# Patient Record
Sex: FEMALE | Race: WHITE | NOT HISPANIC OR LATINO
[De-identification: names, ages, dates, MRNs, and addresses within clinical notes are randomized per-mention and may not be internally consistent; named-entity substitution may affect disease eponyms.]

---

## 2018-02-07 ENCOUNTER — APPOINTMENT (OUTPATIENT)
Dept: NEUROSURGERY | Facility: CLINIC | Age: 83
End: 2018-02-07
Payer: MEDICARE

## 2018-02-07 VITALS
SYSTOLIC BLOOD PRESSURE: 127 MMHG | HEART RATE: 71 BPM | OXYGEN SATURATION: 96 % | BODY MASS INDEX: 32.95 KG/M2 | HEIGHT: 64 IN | TEMPERATURE: 98.5 F | WEIGHT: 193 LBS | DIASTOLIC BLOOD PRESSURE: 57 MMHG

## 2018-02-07 DIAGNOSIS — Z87.09 PERSONAL HISTORY OF OTHER DISEASES OF THE RESPIRATORY SYSTEM: ICD-10-CM

## 2018-02-07 DIAGNOSIS — Z63.4 DISAPPEARANCE AND DEATH OF FAMILY MEMBER: ICD-10-CM

## 2018-02-07 DIAGNOSIS — S49.91XA UNSPECIFIED INJURY OF RIGHT SHOULDER AND UPPER ARM, INITIAL ENCOUNTER: ICD-10-CM

## 2018-02-07 DIAGNOSIS — Z86.69 PERSONAL HISTORY OF OTHER DISEASES OF THE NERVOUS SYSTEM AND SENSE ORGANS: ICD-10-CM

## 2018-02-07 DIAGNOSIS — Z86.39 PERSONAL HISTORY OF OTHER ENDOCRINE, NUTRITIONAL AND METABOLIC DISEASE: ICD-10-CM

## 2018-02-07 DIAGNOSIS — Z86.73 PERSONAL HISTORY OF TRANSIENT ISCHEMIC ATTACK (TIA), AND CEREBRAL INFARCTION W/OUT RESIDUAL DEFICITS: ICD-10-CM

## 2018-02-07 DIAGNOSIS — M19.90 UNSPECIFIED OSTEOARTHRITIS, UNSPECIFIED SITE: ICD-10-CM

## 2018-02-07 PROCEDURE — 99205 OFFICE O/P NEW HI 60 MIN: CPT

## 2018-02-07 RX ORDER — CHROMIUM 200 MCG
TABLET ORAL
Refills: 0 | Status: ACTIVE | COMMUNITY

## 2018-02-07 RX ORDER — SITAGLIPTIN 100 MG/1
TABLET, FILM COATED ORAL
Refills: 0 | Status: ACTIVE | COMMUNITY

## 2018-02-07 RX ORDER — ASPIRIN AND DIPYRIDAMOLE 25; 200 MG/1; MG/1
25-200 CAPSULE, EXTENDED RELEASE ORAL
Refills: 0 | Status: ACTIVE | COMMUNITY

## 2018-02-07 RX ORDER — MEMANTINE HYDROCHLORIDE 5 MG/1
TABLET ORAL
Refills: 0 | Status: ACTIVE | COMMUNITY

## 2018-02-07 SDOH — SOCIAL STABILITY - SOCIAL INSECURITY: DISSAPEARANCE AND DEATH OF FAMILY MEMBER: Z63.4

## 2018-08-01 ENCOUNTER — APPOINTMENT (OUTPATIENT)
Dept: HEART AND VASCULAR | Facility: CLINIC | Age: 83
End: 2018-08-01
Payer: MEDICARE

## 2018-08-01 VITALS
TEMPERATURE: 98.1 F | RESPIRATION RATE: 16 BRPM | HEART RATE: 57 BPM | HEIGHT: 64 IN | WEIGHT: 194 LBS | SYSTOLIC BLOOD PRESSURE: 122 MMHG | BODY MASS INDEX: 33.12 KG/M2 | DIASTOLIC BLOOD PRESSURE: 74 MMHG

## 2018-08-01 DIAGNOSIS — I10 ESSENTIAL (PRIMARY) HYPERTENSION: ICD-10-CM

## 2018-08-01 DIAGNOSIS — Z82.49 FAMILY HISTORY OF ISCHEMIC HEART DISEASE AND OTHER DISEASES OF THE CIRCULATORY SYSTEM: ICD-10-CM

## 2018-08-01 PROCEDURE — 99204 OFFICE O/P NEW MOD 45 MIN: CPT | Mod: 25

## 2018-08-01 PROCEDURE — 93306 TTE W/DOPPLER COMPLETE: CPT

## 2018-08-01 PROCEDURE — 93000 ELECTROCARDIOGRAM COMPLETE: CPT

## 2018-08-01 RX ORDER — DILTIAZEM HYDROCHLORIDE 120 MG/1
120 CAPSULE, EXTENDED RELEASE ORAL
Refills: 0 | Status: COMPLETED | COMMUNITY
End: 2018-08-01

## 2018-08-02 LAB
25(OH)D3 SERPL-MCNC: 33.2 NG/ML
ALBUMIN SERPL ELPH-MCNC: 4.2 G/DL
ALP BLD-CCNC: 62 U/L
ALT SERPL-CCNC: 12 U/L
ANION GAP SERPL CALC-SCNC: 15 MMOL/L
AST SERPL-CCNC: 16 U/L
BASOPHILS # BLD AUTO: 0.03 K/UL
BASOPHILS NFR BLD AUTO: 0.4 %
BILIRUB SERPL-MCNC: 0.2 MG/DL
BUN SERPL-MCNC: 17 MG/DL
CALCIUM SERPL-MCNC: 9.6 MG/DL
CHLORIDE SERPL-SCNC: 104 MMOL/L
CHOLEST SERPL-MCNC: 133 MG/DL
CHOLEST/HDLC SERPL: 3 RATIO
CO2 SERPL-SCNC: 23 MMOL/L
CREAT SERPL-MCNC: 1.12 MG/DL
EOSINOPHIL # BLD AUTO: 0.29 K/UL
EOSINOPHIL NFR BLD AUTO: 3.8 %
GLUCOSE SERPL-MCNC: 91 MG/DL
HBA1C MFR BLD HPLC: 6.7 %
HCT VFR BLD CALC: 31.6 %
HDLC SERPL-MCNC: 45 MG/DL
HGB BLD-MCNC: 9.6 G/DL
IMM GRANULOCYTES NFR BLD AUTO: 0.1 %
LDLC SERPL CALC-MCNC: 55 MG/DL
LYMPHOCYTES # BLD AUTO: 2.11 K/UL
LYMPHOCYTES NFR BLD AUTO: 27.9 %
MAN DIFF?: NORMAL
MCHC RBC-ENTMCNC: 26.4 PG
MCHC RBC-ENTMCNC: 30.4 GM/DL
MCV RBC AUTO: 86.8 FL
MONOCYTES # BLD AUTO: 1.09 K/UL
MONOCYTES NFR BLD AUTO: 14.4 %
NEUTROPHILS # BLD AUTO: 4.03 K/UL
NEUTROPHILS NFR BLD AUTO: 53.4 %
PLATELET # BLD AUTO: 282 K/UL
POTASSIUM SERPL-SCNC: 4.5 MMOL/L
PROT SERPL-MCNC: 6.5 G/DL
RBC # BLD: 3.64 M/UL
RBC # FLD: 16.4 %
SODIUM SERPL-SCNC: 142 MMOL/L
TRIGL SERPL-MCNC: 163 MG/DL
TSH SERPL-ACNC: 2.34 UIU/ML
WBC # FLD AUTO: 7.56 K/UL

## 2018-09-12 ENCOUNTER — APPOINTMENT (OUTPATIENT)
Dept: HEART AND VASCULAR | Facility: CLINIC | Age: 83
End: 2018-09-12
Payer: MEDICARE

## 2018-09-12 VITALS
DIASTOLIC BLOOD PRESSURE: 64 MMHG | HEART RATE: 57 BPM | BODY MASS INDEX: 33.8 KG/M2 | OXYGEN SATURATION: 97 % | SYSTOLIC BLOOD PRESSURE: 112 MMHG | HEIGHT: 64 IN | WEIGHT: 198 LBS

## 2018-09-12 PROCEDURE — 99214 OFFICE O/P EST MOD 30 MIN: CPT

## 2018-10-30 ENCOUNTER — RX RENEWAL (OUTPATIENT)
Age: 83
End: 2018-10-30

## 2020-02-25 ENCOUNTER — INPATIENT (INPATIENT)
Facility: HOSPITAL | Age: 85
LOS: 4 days | Discharge: HOME HEALTH | DRG: 419 | End: 2020-03-01
Attending: SURGERY | Admitting: SURGERY
Payer: MEDICARE

## 2020-02-25 VITALS
RESPIRATION RATE: 18 BRPM | TEMPERATURE: 98 F | WEIGHT: 181 LBS | DIASTOLIC BLOOD PRESSURE: 65 MMHG | HEIGHT: 64 IN | SYSTOLIC BLOOD PRESSURE: 115 MMHG | HEART RATE: 57 BPM | OXYGEN SATURATION: 95 %

## 2020-02-25 LAB
APPEARANCE UR: ABNORMAL
BILIRUB UR-MCNC: NEGATIVE — SIGNIFICANT CHANGE UP
BLD GP AB SCN SERPL QL: NEGATIVE — SIGNIFICANT CHANGE UP
COLOR SPEC: YELLOW — SIGNIFICANT CHANGE UP
DIFF PNL FLD: NEGATIVE — SIGNIFICANT CHANGE UP
GLUCOSE BLDC GLUCOMTR-MCNC: 87 MG/DL — SIGNIFICANT CHANGE UP (ref 70–99)
GLUCOSE BLDC GLUCOMTR-MCNC: 89 MG/DL — SIGNIFICANT CHANGE UP (ref 70–99)
GLUCOSE UR QL: NEGATIVE — SIGNIFICANT CHANGE UP
KETONES UR-MCNC: NEGATIVE — SIGNIFICANT CHANGE UP
LEUKOCYTE ESTERASE UR-ACNC: ABNORMAL
NITRITE UR-MCNC: NEGATIVE — SIGNIFICANT CHANGE UP
PH UR: 7 — SIGNIFICANT CHANGE UP (ref 5–8)
PROT UR-MCNC: NEGATIVE MG/DL — SIGNIFICANT CHANGE UP
RH IG SCN BLD-IMP: POSITIVE — SIGNIFICANT CHANGE UP
SP GR SPEC: 1.01 — SIGNIFICANT CHANGE UP (ref 1–1.03)
UROBILINOGEN FLD QL: 0.2 E.U./DL — SIGNIFICANT CHANGE UP

## 2020-02-25 PROCEDURE — 71045 X-RAY EXAM CHEST 1 VIEW: CPT | Mod: 26

## 2020-02-25 PROCEDURE — 93010 ELECTROCARDIOGRAM REPORT: CPT

## 2020-02-25 PROCEDURE — 74177 CT ABD & PELVIS W/CONTRAST: CPT | Mod: 26

## 2020-02-25 PROCEDURE — 76705 ECHO EXAM OF ABDOMEN: CPT | Mod: 26

## 2020-02-25 PROCEDURE — 99285 EMERGENCY DEPT VISIT HI MDM: CPT

## 2020-02-25 RX ORDER — INSULIN LISPRO 100/ML
VIAL (ML) SUBCUTANEOUS
Refills: 0 | Status: DISCONTINUED | OUTPATIENT
Start: 2020-02-25 | End: 2020-03-01

## 2020-02-25 RX ORDER — BUDESONIDE AND FORMOTEROL FUMARATE DIHYDRATE 160; 4.5 UG/1; UG/1
2 AEROSOL RESPIRATORY (INHALATION)
Refills: 0 | Status: DISCONTINUED | OUTPATIENT
Start: 2020-02-25 | End: 2020-03-01

## 2020-02-25 RX ORDER — ACETAMINOPHEN 500 MG
650 TABLET ORAL EVERY 6 HOURS
Refills: 0 | Status: DISCONTINUED | OUTPATIENT
Start: 2020-02-25 | End: 2020-03-01

## 2020-02-25 RX ORDER — GLUCAGON INJECTION, SOLUTION 0.5 MG/.1ML
1 INJECTION, SOLUTION SUBCUTANEOUS ONCE
Refills: 0 | Status: DISCONTINUED | OUTPATIENT
Start: 2020-02-25 | End: 2020-03-01

## 2020-02-25 RX ORDER — FLUTICASONE PROPIONATE AND SALMETEROL 50; 250 UG/1; UG/1
2 POWDER ORAL; RESPIRATORY (INHALATION)
Qty: 0 | Refills: 0 | DISCHARGE

## 2020-02-25 RX ORDER — HEPARIN SODIUM 5000 [USP'U]/ML
5000 INJECTION INTRAVENOUS; SUBCUTANEOUS EVERY 8 HOURS
Refills: 0 | Status: DISCONTINUED | OUTPATIENT
Start: 2020-02-25 | End: 2020-03-01

## 2020-02-25 RX ORDER — DEXTROSE 50 % IN WATER 50 %
25 SYRINGE (ML) INTRAVENOUS ONCE
Refills: 0 | Status: DISCONTINUED | OUTPATIENT
Start: 2020-02-25 | End: 2020-03-01

## 2020-02-25 RX ORDER — SODIUM CHLORIDE 9 MG/ML
1000 INJECTION, SOLUTION INTRAVENOUS
Refills: 0 | Status: DISCONTINUED | OUTPATIENT
Start: 2020-02-25 | End: 2020-02-28

## 2020-02-25 RX ORDER — SODIUM CHLORIDE 9 MG/ML
1000 INJECTION, SOLUTION INTRAVENOUS
Refills: 0 | Status: DISCONTINUED | OUTPATIENT
Start: 2020-02-25 | End: 2020-03-01

## 2020-02-25 RX ORDER — TIOTROPIUM BROMIDE 18 UG/1
1 CAPSULE ORAL; RESPIRATORY (INHALATION)
Qty: 0 | Refills: 0 | DISCHARGE

## 2020-02-25 RX ORDER — LEVOCETIRIZINE DIHYDROCHLORIDE 0.5 MG/ML
1 SOLUTION ORAL
Qty: 0 | Refills: 0 | DISCHARGE

## 2020-02-25 RX ORDER — ATORVASTATIN CALCIUM 80 MG/1
20 TABLET, FILM COATED ORAL AT BEDTIME
Refills: 0 | Status: DISCONTINUED | OUTPATIENT
Start: 2020-02-25 | End: 2020-03-01

## 2020-02-25 RX ORDER — BISOPROLOL FUMARATE 10 MG/1
1 TABLET, FILM COATED ORAL
Qty: 0 | Refills: 0 | DISCHARGE

## 2020-02-25 RX ORDER — LOSARTAN POTASSIUM 100 MG/1
100 TABLET, FILM COATED ORAL DAILY
Refills: 0 | Status: DISCONTINUED | OUTPATIENT
Start: 2020-02-26 | End: 2020-03-01

## 2020-02-25 RX ORDER — PANTOPRAZOLE SODIUM 20 MG/1
40 TABLET, DELAYED RELEASE ORAL
Refills: 0 | Status: DISCONTINUED | OUTPATIENT
Start: 2020-02-25 | End: 2020-03-01

## 2020-02-25 RX ORDER — DEXTROSE 50 % IN WATER 50 %
15 SYRINGE (ML) INTRAVENOUS ONCE
Refills: 0 | Status: DISCONTINUED | OUTPATIENT
Start: 2020-02-25 | End: 2020-03-01

## 2020-02-25 RX ORDER — TIOTROPIUM BROMIDE 18 UG/1
1 CAPSULE ORAL; RESPIRATORY (INHALATION) DAILY
Refills: 0 | Status: COMPLETED | OUTPATIENT
Start: 2020-02-25 | End: 2020-02-26

## 2020-02-25 RX ORDER — SERTRALINE 25 MG/1
1 TABLET, FILM COATED ORAL
Qty: 0 | Refills: 0 | DISCHARGE

## 2020-02-25 RX ORDER — SODIUM CHLORIDE 9 MG/ML
500 INJECTION INTRAMUSCULAR; INTRAVENOUS; SUBCUTANEOUS ONCE
Refills: 0 | Status: COMPLETED | OUTPATIENT
Start: 2020-02-25 | End: 2020-02-25

## 2020-02-25 RX ORDER — DEXTROSE 50 % IN WATER 50 %
12.5 SYRINGE (ML) INTRAVENOUS ONCE
Refills: 0 | Status: DISCONTINUED | OUTPATIENT
Start: 2020-02-25 | End: 2020-03-01

## 2020-02-25 RX ORDER — LINAGLIPTIN 5 MG/1
1 TABLET, FILM COATED ORAL
Qty: 0 | Refills: 0 | DISCHARGE

## 2020-02-25 RX ORDER — ATENOLOL 25 MG/1
50 TABLET ORAL DAILY
Refills: 0 | Status: DISCONTINUED | OUTPATIENT
Start: 2020-02-26 | End: 2020-03-01

## 2020-02-25 RX ORDER — TRAZODONE HCL 50 MG
50 TABLET ORAL DAILY
Refills: 0 | Status: DISCONTINUED | OUTPATIENT
Start: 2020-02-25 | End: 2020-03-01

## 2020-02-25 RX ORDER — FUROSEMIDE 40 MG
1 TABLET ORAL
Qty: 0 | Refills: 0 | DISCHARGE

## 2020-02-25 RX ADMIN — SODIUM CHLORIDE 25 MILLILITER(S): 9 INJECTION, SOLUTION INTRAVENOUS at 19:36

## 2020-02-25 RX ADMIN — Medication 50 MILLIGRAM(S): at 21:31

## 2020-02-25 RX ADMIN — SODIUM CHLORIDE 500 MILLILITER(S): 9 INJECTION INTRAMUSCULAR; INTRAVENOUS; SUBCUTANEOUS at 17:12

## 2020-02-25 RX ADMIN — ATORVASTATIN CALCIUM 20 MILLIGRAM(S): 80 TABLET, FILM COATED ORAL at 21:31

## 2020-02-25 RX ADMIN — HEPARIN SODIUM 5000 UNIT(S): 5000 INJECTION INTRAVENOUS; SUBCUTANEOUS at 21:34

## 2020-02-25 NOTE — H&P ADULT - NSHPPHYSICALEXAM_GEN_ALL_CORE
Vital Signs Last 24 Hrs  T(C): 37 (25 Feb 2020 18:25), Max: 37 (25 Feb 2020 18:25)  T(F): 98.6 (25 Feb 2020 18:25), Max: 98.6 (25 Feb 2020 18:25)  HR: 51 (25 Feb 2020 18:25) (51 - 61)  BP: 114/70 (25 Feb 2020 18:25) (114/70 - 133/59)  BP(mean): --  RR: 16 (25 Feb 2020 18:25) (16 - 18)  SpO2: 97% (25 Feb 2020 18:25) (95% - 97%)    General: NAD, resting comfortably  Neuro: AAOX3, EOMI, PERRLA, no scleral icterus  Pulm: CTAB, Nonlabored breathing  CV: S1/S2 normal, no murmurs  Abdomen: soft, nondistended, mild epigastric tenderness, no RUQ tenderness, mild RLQ tenderness, negative Farmer's sign no rebound, no guarding  Extremities: WWP, No LE edema b/l

## 2020-02-25 NOTE — ED PROVIDER NOTE - CLINICAL SUMMARY MEDICAL DECISION MAKING FREE TEXT BOX
87 y/o f hx HTN, DM, HLD, cholelithiasis presents c/o RUQ abd pain with n/v x 3 weeks; afebrile in ED, vss, labs wnl.  RUQ u/s shows gallstones, no cholecystitis.  Surgery wanting CT for further evaluation prior to disposition, reached out to Dr. Connors with no response.  Will f/u CT and surgery recommendations.

## 2020-02-25 NOTE — H&P ADULT - NSHPLABSRESULTS_GEN_ALL_CORE
11.1   6.73  )-----------( 276      ( 25 Feb 2020 12:53 )             35.1   02-25    139  |  103  |  8   ----------------------------<  118<H>  4.7   |  26  |  0.94    Ca    9.1      25 Feb 2020 12:53  Mg     2.3     02-25    TPro  6.5  /  Alb  3.4  /  TBili  0.2  /  DBili  x   /  AST  21  /  ALT  11  /  AlkPhos  45  02-25    < from: US Abdomen Limited (02.25.20 @ 14:51) >    FINDINGS:     Liver: Coarse increased echogenicity with no visible focal abnormality. Normal Size.  Patent portal vein with normal directionality of flow.    Intrahepatic ducts: Not dilated.    Common bile duct: Normal diameter, measuring 0.3 cm.    Gallbladder: There are multiple echogenic and mobile foci within the gallbladder with posterior acoustic shadowing, consistent with gallstones. The largest stone measures 1.3 cm. No wall thickening or pericholecystic fluid. Negative sonographic Farmer sign.    Pancreas:Not visualized on this examination, secondary to bowel gas.    Abdominal aorta: No abdominal aortic aneurysm is seen.    Inferior vena cava: The visualized portions were normal in appearance.    Right kidney: Length of 10.0 cm. Normal echogenicity. No focal lesions. No hydronephrosis.     IMPRESSION:  Cholelithiasis without sonographic evidence of acute cholecystitis.    Coarse increased hepatic echotexture suggestive of mild hepatic steatosis.    Pancreas not visualized due to overlying bowel gas.        < end of copied text >        Final CT scan results pending

## 2020-02-25 NOTE — H&P ADULT - NSICDXPASTMEDICALHX_GEN_ALL_CORE_FT
PAST MEDICAL HISTORY:  Dementia     History of Total Knee Replacement     History of Transient Ischemic Attack (TIA)     HTN (Hypertension)     Hyperlipidemia     Type II or unspecified type diabetes mellitus without mention of complication, not stated as uncontr

## 2020-02-25 NOTE — H&P ADULT - HISTORY OF PRESENT ILLNESS
HPI: 88F PMH DM, HTN, dementia, TIAX2 (5yrs ago), arthritis, unknown colitis PSH of b/l knee arthroplasty p/w 3 wks of RUQ pain, nausea and emesis. Per pt and daughter at bedside she has had achey intermittent RUQ pain for the past 3 wks, worsening after meals and associated with nausea and episodes of small bilious emes every 3-4 days (last episode 2 days ago). Pt otherwise denies fever/chills/chest pain/dyspnea, reports constipation for the past few wks (last BM 3 days ago), passing flatus daily, voiding regularly without dysuria/pyuria. Pt reports unknown type of colitis and is followed by GI Dr. Carlson, but denies personal/fam hx of UC/Crohn's disease. Last cscope and EGD both about 5 yrs ago and normal per pt.     PMH: As above  PSH: b/l knee arthroplasty '95 and '98, L hip arthroplasty 2012  Meds: Aggrenox 25mg BID, Crestor 20mg d, [id number] d, Tradjenta 5mg daily, Levotrizine 5mg d, Furosemide 40mg d, Namenda 10mg BID, prevacid 30mg d, Lovaza 1000 bid, Zoloft 25mg d, Trasodouc 50mg d, Creon [id number], isoprolol 5mg d, advair /21, Spiriva 125 mg d  Allergies: denies  Social: denies smoking, denies etoh use, denies illicit drug use

## 2020-02-25 NOTE — H&P ADULT - ASSESSMENT
A/P: 88F PMH DM, HTN, dementia, TIAX2 (5yrs ago), arthritis, unknown colitis PSH of b/l knee arthroplasty p/w 3 wks of epigastric pain, nausea and emesis.    Admit to Dr. Connors, regional  Tylenol for pain control  NPO w/sips, gentle hydration   Medicine consult w/ Dr. Hawkins for perioperative clearance and optimization  GI consult Dr. Hensley for ERCP in setting of chronic epigastric pain  HSQ/SCDs for DVT ppx  CXR/EKG  AM labs  Discussed w/chief resident and Dr. Connors A/P: 88F PMH DM, HTN, dementia, TIAX2 (5yrs ago), arthritis, unknown colitis PSH of b/l knee arthroplasty p/w 3 wks of epigastric pain, nausea and emesis.    Admit to Dr. Connors, regional  Tylenol for pain control  NPO w/sips, gentle hydration   F/U Final CT A/P read  Medicine consult w/ Dr. Hawkins for perioperative clearance and optimization  GI consult Dr. Hensley for ERCP in setting of chronic epigastric pain  HSQ/SCDs for DVT ppx  CXR/EKG  AM labs  Discussed w/chief resident and Dr. Connors

## 2020-02-25 NOTE — ED ADULT NURSE NOTE - OBJECTIVE STATEMENT
Pt is an 88y female referred by MD Connors for direct admission for acute cholecystitis. PT had outpatient CT, referred for direct admission for surgery. PT c/o N/V, last Bm was 3 days ago. Denies F/chills/ no CP/SOB. hx of HTN and DM.

## 2020-02-25 NOTE — ED PROVIDER NOTE - ATTENDING CONTRIBUTION TO CARE
87 yo F with PMH of HTN, HLD, DM, cholelithiasis presents c/o RUQ pain over the past 3 weeks associated with nausea and vomiting.  Pt reports she saw Dr. Connors, surgeon, today who referred her to ED.  Pt denies fever, chills, CP, SOB, all other ROS negative. Pt AAO, NAD, RRR, CTA b/l, abd: soft, (+) RUQ abd TTP, no erebound, no guarding. Pt afebrile in ED, vss, labs wnl.  RUQ u/s shows gallstones, no cholecystitis. Per surgery team CT ordered. Pt admitted to surgery. Stable in ED.

## 2020-02-25 NOTE — ED PROVIDER NOTE - OBJECTIVE STATEMENT
89 y/o f hx HTN, HLD, DM, cholelithiasis presents c/o RUQ pain over the past 3 weeks associated with nausea and vomiting.  Pt reports she saw Dr. Connors, surgeon, today who referred her to ED.  Pt denies fever, chills, CP, SOB, all other ROS negative.

## 2020-02-25 NOTE — ED ADULT NURSE NOTE - NSIMPLEMENTINTERV_GEN_ALL_ED
Implemented All Fall with Harm Risk Interventions:  [place] to call system. Call bell, personal items and telephone within reach. Instruct patient to call for assistance. Room bathroom lighting operational. Non-slip footwear when patient is off stretcher. Physically safe environment: no spills, clutter or unnecessary equipment. Stretcher in lowest position, wheels locked, appropriate side rails in place. Provide visual cue, wrist band, yellow gown, etc. Monitor gait and stability. Monitor for mental status changes and reorient to person, place, and time. Review medications for side effects contributing to fall risk. Reinforce activity limits and safety measures with patient and family. Provide visual clues: red socks.

## 2020-02-26 DIAGNOSIS — R10.9 UNSPECIFIED ABDOMINAL PAIN: ICD-10-CM

## 2020-02-26 DIAGNOSIS — I10 ESSENTIAL (PRIMARY) HYPERTENSION: ICD-10-CM

## 2020-02-26 DIAGNOSIS — Z01.818 ENCOUNTER FOR OTHER PREPROCEDURAL EXAMINATION: ICD-10-CM

## 2020-02-26 DIAGNOSIS — E78.5 HYPERLIPIDEMIA, UNSPECIFIED: ICD-10-CM

## 2020-02-26 DIAGNOSIS — F03.90 UNSPECIFIED DEMENTIA WITHOUT BEHAVIORAL DISTURBANCE: ICD-10-CM

## 2020-02-26 DIAGNOSIS — E11.9 TYPE 2 DIABETES MELLITUS WITHOUT COMPLICATIONS: ICD-10-CM

## 2020-02-26 DIAGNOSIS — Z86.73 PERSONAL HISTORY OF TRANSIENT ISCHEMIC ATTACK (TIA), AND CEREBRAL INFARCTION WITHOUT RESIDUAL DEFICITS: ICD-10-CM

## 2020-02-26 DIAGNOSIS — J45.909 UNSPECIFIED ASTHMA, UNCOMPLICATED: ICD-10-CM

## 2020-02-26 LAB
ALBUMIN SERPL ELPH-MCNC: 3.7 G/DL — SIGNIFICANT CHANGE UP (ref 3.3–5)
ALP SERPL-CCNC: 47 U/L — SIGNIFICANT CHANGE UP (ref 40–120)
ALT FLD-CCNC: 12 U/L — SIGNIFICANT CHANGE UP (ref 10–45)
ANION GAP SERPL CALC-SCNC: 11 MMOL/L — SIGNIFICANT CHANGE UP (ref 5–17)
AST SERPL-CCNC: 17 U/L — SIGNIFICANT CHANGE UP (ref 10–40)
BILIRUB SERPL-MCNC: 0.2 MG/DL — SIGNIFICANT CHANGE UP (ref 0.2–1.2)
BUN SERPL-MCNC: 8 MG/DL — SIGNIFICANT CHANGE UP (ref 7–23)
CALCIUM SERPL-MCNC: 9.5 MG/DL — SIGNIFICANT CHANGE UP (ref 8.4–10.5)
CHLORIDE SERPL-SCNC: 106 MMOL/L — SIGNIFICANT CHANGE UP (ref 96–108)
CO2 SERPL-SCNC: 27 MMOL/L — SIGNIFICANT CHANGE UP (ref 22–31)
CREAT SERPL-MCNC: 0.94 MG/DL — SIGNIFICANT CHANGE UP (ref 0.5–1.3)
GLUCOSE BLDC GLUCOMTR-MCNC: 80 MG/DL — SIGNIFICANT CHANGE UP (ref 70–99)
GLUCOSE BLDC GLUCOMTR-MCNC: 84 MG/DL — SIGNIFICANT CHANGE UP (ref 70–99)
GLUCOSE BLDC GLUCOMTR-MCNC: 84 MG/DL — SIGNIFICANT CHANGE UP (ref 70–99)
GLUCOSE BLDC GLUCOMTR-MCNC: 86 MG/DL — SIGNIFICANT CHANGE UP (ref 70–99)
GLUCOSE SERPL-MCNC: 103 MG/DL — HIGH (ref 70–99)
HBA1C BLD-MCNC: 5.7 % — HIGH (ref 4–5.6)
HCT VFR BLD CALC: 36.3 % — SIGNIFICANT CHANGE UP (ref 34.5–45)
HGB BLD-MCNC: 11.8 G/DL — SIGNIFICANT CHANGE UP (ref 11.5–15.5)
MAGNESIUM SERPL-MCNC: 2.3 MG/DL — SIGNIFICANT CHANGE UP (ref 1.6–2.6)
MCHC RBC-ENTMCNC: 32 PG — SIGNIFICANT CHANGE UP (ref 27–34)
MCHC RBC-ENTMCNC: 32.5 GM/DL — SIGNIFICANT CHANGE UP (ref 32–36)
MCV RBC AUTO: 98.4 FL — SIGNIFICANT CHANGE UP (ref 80–100)
NRBC # BLD: 0 /100 WBCS — SIGNIFICANT CHANGE UP (ref 0–0)
PHOSPHATE SERPL-MCNC: 3.7 MG/DL — SIGNIFICANT CHANGE UP (ref 2.5–4.5)
PLATELET # BLD AUTO: 306 K/UL — SIGNIFICANT CHANGE UP (ref 150–400)
POTASSIUM SERPL-MCNC: 4.6 MMOL/L — SIGNIFICANT CHANGE UP (ref 3.5–5.3)
POTASSIUM SERPL-SCNC: 4.6 MMOL/L — SIGNIFICANT CHANGE UP (ref 3.5–5.3)
PROT SERPL-MCNC: 6.7 G/DL — SIGNIFICANT CHANGE UP (ref 6–8.3)
RBC # BLD: 3.69 M/UL — LOW (ref 3.8–5.2)
RBC # FLD: 12.7 % — SIGNIFICANT CHANGE UP (ref 10.3–14.5)
SODIUM SERPL-SCNC: 144 MMOL/L — SIGNIFICANT CHANGE UP (ref 135–145)
WBC # BLD: 6.96 K/UL — SIGNIFICANT CHANGE UP (ref 3.8–10.5)
WBC # FLD AUTO: 6.96 K/UL — SIGNIFICANT CHANGE UP (ref 3.8–10.5)

## 2020-02-26 PROCEDURE — 99222 1ST HOSP IP/OBS MODERATE 55: CPT | Mod: GC

## 2020-02-26 RX ORDER — ONDANSETRON 8 MG/1
4 TABLET, FILM COATED ORAL ONCE
Refills: 0 | Status: COMPLETED | OUTPATIENT
Start: 2020-02-26 | End: 2020-02-26

## 2020-02-26 RX ADMIN — ONDANSETRON 4 MILLIGRAM(S): 8 TABLET, FILM COATED ORAL at 22:18

## 2020-02-26 RX ADMIN — HEPARIN SODIUM 5000 UNIT(S): 5000 INJECTION INTRAVENOUS; SUBCUTANEOUS at 07:15

## 2020-02-26 RX ADMIN — HEPARIN SODIUM 5000 UNIT(S): 5000 INJECTION INTRAVENOUS; SUBCUTANEOUS at 14:21

## 2020-02-26 RX ADMIN — HEPARIN SODIUM 5000 UNIT(S): 5000 INJECTION INTRAVENOUS; SUBCUTANEOUS at 22:18

## 2020-02-26 RX ADMIN — BUDESONIDE AND FORMOTEROL FUMARATE DIHYDRATE 2 PUFF(S): 160; 4.5 AEROSOL RESPIRATORY (INHALATION) at 11:28

## 2020-02-26 RX ADMIN — ATORVASTATIN CALCIUM 20 MILLIGRAM(S): 80 TABLET, FILM COATED ORAL at 22:18

## 2020-02-26 RX ADMIN — LOSARTAN POTASSIUM 100 MILLIGRAM(S): 100 TABLET, FILM COATED ORAL at 11:28

## 2020-02-26 RX ADMIN — PANTOPRAZOLE SODIUM 40 MILLIGRAM(S): 20 TABLET, DELAYED RELEASE ORAL at 07:16

## 2020-02-26 RX ADMIN — SODIUM CHLORIDE 30 MILLILITER(S): 9 INJECTION, SOLUTION INTRAVENOUS at 11:29

## 2020-02-26 NOTE — CONSULT NOTE ADULT - SUBJECTIVE AND OBJECTIVE BOX
HPI:  HPI: 88F PMH DM, HTN, dementia, TIAX2 (5yrs ago), arthritis, unknown colitis PSH of b/l knee arthroplasty p/w 3 wks of RUQ pain, nausea and emesis. Per pt and daughter at bedside she has had achey intermittent RUQ pain for the past 3 wks, worsening after meals and associated with nausea and episodes of small bilious emes every 3-4 days (last episode 2 days ago). Pt otherwise denies fever/chills/chest pain/dyspnea, reports constipation for the past few wks (last BM 3 days ago), passing flatus daily, voiding regularly without dysuria/pyuria. Pt reports unknown type of colitis and is followed by GI Dr. Carlson, but denies personal/fam hx of UC/Crohn's disease. Last cscope and EGD both about 5 yrs ago and normal per pt.     PMH: As above  PSH: b/l knee arthroplasty  and , L hip arthroplasty   Meds: Aggrenox 25mg BID, Crestor 20mg d, [id number] d, Tradjenta 5mg daily, Levotrizine 5mg d, Furosemide 40mg d, Namenda 10mg BID, prevacid 30mg d, Lovaza 1000 bid, Zoloft 25mg d, Trasodouc 50mg d, Creon [id number], isoprolol 5mg d, advair /21, Spiriva 125 mg d  Allergies: denies  Social: denies smoking, denies etoh use, denies illicit drug use (2020 18:39)      PAST MEDICAL & SURGICAL HISTORY:  History of Total Knee Replacement  History of Transient Ischemic Attack (TIA)  Type II or unspecified type diabetes mellitus without mention of complication, not stated as uncontr  Hyperlipidemia  HTN (Hypertension)  Dementia  History of Total Knee Replacement      REVIEW OF SYSTEMS      General:	    Skin/Breast:  	  Ophthalmologic:  	  ENMT:	    Respiratory and Thorax:  	  Cardiovascular:	    Gastrointestinal:	    Genitourinary:	    Musculoskeletal:	    Neurological:	    Psychiatric:	    Hematology/Lymphatics:	    Endocrine:	    Allergic/Immunologic:	    MEDICATIONS  (STANDING):  ATENolol  Tablet 50 milliGRAM(s) Oral daily  atorvastatin 20 milliGRAM(s) Oral at bedtime  budesonide 160 MICROgram(s)/formoterol 4.5 MICROgram(s) Inhaler 2 Puff(s) Inhalation two times a day  dextrose 5%. 1000 milliLiter(s) (50 mL/Hr) IV Continuous <Continuous>  dextrose 50% Injectable 12.5 Gram(s) IV Push once  dextrose 50% Injectable 25 Gram(s) IV Push once  dextrose 50% Injectable 25 Gram(s) IV Push once  heparin  Injectable 5000 Unit(s) SubCutaneous every 8 hours  insulin lispro (HumaLOG) corrective regimen sliding scale   SubCutaneous Before meals and at bedtime  lactated ringers. 1000 milliLiter(s) (30 mL/Hr) IV Continuous <Continuous>  losartan 100 milliGRAM(s) Oral daily  pantoprazole    Tablet 40 milliGRAM(s) Oral before breakfast  tiotropium 18 MICROgram(s) Capsule 1 Capsule(s) Inhalation daily  traZODone 50 milliGRAM(s) Oral daily    MEDICATIONS  (PRN):  acetaminophen   Tablet .. 650 milliGRAM(s) Oral every 6 hours PRN Severe Pain (7 - 10)  dextrose 40% Gel 15 Gram(s) Oral once PRN Blood Glucose LESS THAN 70 milliGRAM(s)/deciliter  glucagon  Injectable 1 milliGRAM(s) IntraMuscular once PRN Glucose LESS THAN 70 milligrams/deciliter      Allergies    No Known Allergies    Intolerances        SOCIAL HISTORY:    FAMILY HISTORY:      Vital Signs Last 24 Hrs  T(C): 36.7 (2020 05:21), Max: 37 (2020 18:25)  T(F): 98.1 (2020 05:21), Max: 98.6 (2020 18:25)  HR: 51 (2020 05:21) (51 - 61)  BP: 151/70 (2020 05:21) (114/70 - 151/70)  BP(mean): --  RR: 18 (2020 05:21) (16 - 18)  SpO2: 96% (2020 05:21) (94% - 97%)    PHYSICAL EXAM:      Constitutional:    Eyes:    ENMT:    Neck:    Breasts:    Back:    Respiratory:    Cardiovascular:    Gastrointestinal:    Genitourinary:    Rectal:    Extremities:    Vascular:    Neurological:    Skin:    Lymph Nodes:    Musculoskeletal:    Psychiatric:        LABS:                        11.8   6.96  )-----------( 306      ( 2020 06:24 )             36.3         144  |  106  |  8   ----------------------------<  103<H>  4.6   |  27  |  0.94    Ca    9.5      2020 06:24  Phos  3.7       Mg     2.3         TPro  6.7  /  Alb  3.7  /  TBili  0.2  /  DBili  x   /  AST  17  /  ALT  12  /  AlkPhos  47      PT/INR - ( 2020 12:53 )   PT: 12.3 sec;   INR: 1.08          PTT - ( 2020 12:53 )  PTT:28.5 sec  Urinalysis Basic - ( 2020 16:41 )    Color: Yellow / Appearance: SL Cloudy / S.010 / pH: x  Gluc: x / Ketone: NEGATIVE  / Bili: Negative / Urobili: 0.2 E.U./dL   Blood: x / Protein: NEGATIVE mg/dL / Nitrite: NEGATIVE   Leuk Esterase: Large / RBC: < 5 /HPF / WBC > 10 /HPF   Sq Epi: x / Non Sq Epi: 5-10 /HPF / Bacteria: Present /HPF        RADIOLOGY & ADDITIONAL STUDIES: HPI:  88yr old Kosovan speaking F with PMHx significant for Cholelithiasis, ?Colitis, Pancreatic insufficiency, H pylori infection in the past sp Rx, DMII, HTN, dementia, TIA x2 (5yrs ago), OA PSH of b/l knee arthroplasty admitted for evaluation of abdominal pain, nausea, and emesis. GI consulted for evaluation of abdominal pain  Patient is a poor historian due to her dementia, most of the hx is obtained form the daughter.  She reports mother started c/o pain 3 weeks ago, RUQ, achy, some radiation to epigastrum, aggravated by po intake, intermittent, associated with nausea (persistent), emesis (mostly post prandial, bilious, non bloody). She decided to bring her to the hospital for further evaluation as she was not improving. Daughter denies any c/o bleeding, abdominal distention, fever, chills, sick contacts, recent travel, leg swelling, dysuria, chest pain, palpitations, diaphoresis, SOB/BROWN.    EGD - 5yrs ago normal per daughter  Colonoscopy - 5yrs ago normal per daughter      PAST MEDICAL & SURGICAL HISTORY:  History of Total Knee Replacement  History of Transient Ischemic Attack (TIA)  Type II or unspecified type diabetes mellitus without mention of complication, not stated as uncontr  Hyperlipidemia  HTN (Hypertension)  Dementia  History of Total Knee Replacement      REVIEW OF SYSTEMS  Apart from items noted in the HPI a 10point ROS was negative      MEDICATIONS  (STANDING):  ATENolol  Tablet 50 milliGRAM(s) Oral daily  atorvastatin 20 milliGRAM(s) Oral at bedtime  budesonide 160 MICROgram(s)/formoterol 4.5 MICROgram(s) Inhaler 2 Puff(s) Inhalation two times a day  dextrose 5%. 1000 milliLiter(s) (50 mL/Hr) IV Continuous <Continuous>  dextrose 50% Injectable 12.5 Gram(s) IV Push once  dextrose 50% Injectable 25 Gram(s) IV Push once  dextrose 50% Injectable 25 Gram(s) IV Push once  heparin  Injectable 5000 Unit(s) SubCutaneous every 8 hours  insulin lispro (HumaLOG) corrective regimen sliding scale   SubCutaneous Before meals and at bedtime  lactated ringers. 1000 milliLiter(s) (30 mL/Hr) IV Continuous <Continuous>  losartan 100 milliGRAM(s) Oral daily  pantoprazole    Tablet 40 milliGRAM(s) Oral before breakfast  tiotropium 18 MICROgram(s) Capsule 1 Capsule(s) Inhalation daily  traZODone 50 milliGRAM(s) Oral daily    MEDICATIONS  (PRN):  acetaminophen   Tablet .. 650 milliGRAM(s) Oral every 6 hours PRN Severe Pain (7 - 10)  dextrose 40% Gel 15 Gram(s) Oral once PRN Blood Glucose LESS THAN 70 milliGRAM(s)/deciliter  glucagon  Injectable 1 milliGRAM(s) IntraMuscular once PRN Glucose LESS THAN 70 milligrams/deciliter      Allergies  No Known Allergies    Intolerances        SOCIAL HISTORY:  Denies toxic or illicit habits    FAMILY HISTORY:  Denies Fhx of stomach/pancreatic/colon cancer, IBD or liver issues      Vital Signs Last 24 Hrs  T(C): 36.7 (2020 05:21), Max: 37 (2020 18:25)  T(F): 98.1 (2020 05:21), Max: 98.6 (2020 18:25)  HR: 51 (2020 05:21) (51 - 61)  BP: 151/70 (2020 05:21) (114/70 - 151/70)  BP(mean): --  RR: 18 (2020 05:21) (16 - 18)  SpO2: 96% (2020 05:21) (94% - 97%)    PHYSICAL EXAM:  GEN: elderly F lying in bed in no distress, anicteric, afebrile, not pale  Eyes: FRANCO  Respiratory: CTA  Cardiovascular: s1 s2 no M RRR  Gastrointestinal: full, soft, BS+, + tenderness RUQ, NR, NG no masses or organs palpated  Rectal: deferred  Extremities: no limb edema  Neurological: AAOx1-2, non focal  Skin: intact        LABS:              11.8   6.96  )-----------( 306      ( 2020 06:24 )             36.3     144  |  106  |  8   ----------------------------<  103<H>  4.6   |  27  |  0.94    Ca    9.5      2020 06:24  Phos  3.7     02-26  Mg     2.3     02-26    TPro  6.7  /  Alb  3.7  /  TBili  0.2  /  DBili  x   /  AST  17  /  ALT  12  /  AlkPhos  47  02-26    PT/INR - ( 2020 12:53 )   PT: 12.3 sec;   INR: 1.08        PTT - ( 2020 12:53 )  PTT:28.5 sec    Urinalysis Basic - ( 2020 16:41 )  Color: Yellow / Appearance: SL Cloudy / S.010 / pH: x  Gluc: x / Ketone: NEGATIVE  / Bili: Negative / Urobili: 0.2 E.U./dL   Blood: x / Protein: NEGATIVE mg/dL / Nitrite: NEGATIVE   Leuk Esterase: Large / RBC: < 5 /HPF / WBC > 10 /HPF   Sq Epi: x / Non Sq Epi: 5-10 /HPF / Bacteria: Present /HPF          RADIOLOGY & ADDITIONAL STUDIES:  US Abdomen Limited (20 @ 14:51) >  IMPRESSION:  Cholelithiasis without sonographic evidence of acute cholecystitis.  Coarse increased hepatic echotexture suggestive of mild hepatic steatosis.  Pancreas not visualized due to overlying bowel gas.      CT Abdomen and Pelvis w/ IV Cont (20 @ 17:33)   Impression:   1.  Cholelithiasis, without CT findings suggestive of acute cholecystitis as noted on prior imaging upper quadrant ultrasound dated 2020  2.  Multiple subcentimeter solid nodules within the left lower lobe measuring up to 6 mm. In both high and low risk individual interval surveillance in 6-12 months and then again at 18-24 months is suggested to confirm stability.  3.  Question mild bladder wall thickening. Cystitis in the proper clinical setting cannot be excluded. Correlation withurinalysis.

## 2020-02-26 NOTE — PROGRESS NOTE ADULT - SUBJECTIVE AND OBJECTIVE BOX
SUBJECTIVE: Patient seen and examined bedside. Patient has no complaints this am.     ATENolol  Tablet 50 milliGRAM(s) Oral daily  heparin  Injectable 5000 Unit(s) SubCutaneous every 8 hours  losartan 100 milliGRAM(s) Oral daily      Vital Signs Last 24 Hrs  T(C): 36.7 (2020 05:21), Max: 37 (2020 18:25)  T(F): 98.1 (2020 05:21), Max: 98.6 (2020 18:25)  HR: 51 (2020 05:21) (51 - 61)  BP: 151/70 (2020 05:21) (114/70 - 151/70)  BP(mean): --  RR: 18 (2020 05:21) (16 - 18)  SpO2: 96% (2020 05:21) (94% - 97%)  I&O's Detail    2020 07:01  -  2020 07:00  --------------------------------------------------------  IN:    lactated ringers.: 180 mL  Total IN: 180 mL    OUT:    Voided: 650 mL  Total OUT: 650 mL    Total NET: -470 mL          General: NAD, resting comfortably in bed  C/V: NSR  Pulm: Nonlabored breathing, no respiratory distress  Abd: soft, nontender, nondistended,  Extrem: WWP, no edema, SCDs in place        LABS:                        11.8   6.96  )-----------( 306      ( 2020 06:24 )             36.3     02-26    144  |  106  |  8   ----------------------------<  103<H>  4.6   |  27  |  0.94    Ca    9.5      2020 06:24  Phos  3.7     -  Mg     2.3         TPro  6.7  /  Alb  3.7  /  TBili  0.2  /  DBili  x   /  AST  17  /  ALT  12  /  AlkPhos  47  02-26    PT/INR - ( 2020 12:53 )   PT: 12.3 sec;   INR: 1.08          PTT - ( 2020 12:53 )  PTT:28.5 sec  Urinalysis Basic - ( 2020 16:41 )    Color: Yellow / Appearance: SL Cloudy / S.010 / pH: x  Gluc: x / Ketone: NEGATIVE  / Bili: Negative / Urobili: 0.2 E.U./dL   Blood: x / Protein: NEGATIVE mg/dL / Nitrite: NEGATIVE   Leuk Esterase: Large / RBC: < 5 /HPF / WBC > 10 /HPF   Sq Epi: x / Non Sq Epi: 5-10 /HPF / Bacteria: Present /HPF        RADIOLOGY & ADDITIONAL STUDIES:

## 2020-02-26 NOTE — PROGRESS NOTE ADULT - SUBJECTIVE AND OBJECTIVE BOX
Doing ok, no pain when NPO. Abdomen is soft, NT, ND. For upper endoscopy today by Dr. Hensley to r/o PUD, followed by romeo vazquez tomorrow

## 2020-02-26 NOTE — CONSULT NOTE ADULT - ASSESSMENT
88yr old Trinidadian speaking F with PMHx significant for Cholelithiasis, ?Colitis, Pancreatic insufficiency, H pylori infection in the past sp Rx, DMII, HTN, dementia, TIA x2 (5yrs ago), OA PSH of b/l knee arthroplasty admitted for evaluation of abdominal pain, nausea, and emesis. GI consulted for evaluation of abdominal pain.    # Abdominal pain -   - patient with hx of H pylori gastritis in the past  - abdominal pain could be related to gastritis vs PUD - but most likely it is related to her cholelithiasis   - primary team asking for possible EGD to evaluate for any gastric or duodenal etiology of her pain  - also planned for possible lap george  - will plan for EGD as schedule permits  - will let team know when she is on schedule so she can be kept NPO the day before      Will discuss with attending Dr Hensley

## 2020-02-26 NOTE — CONSULT NOTE ADULT - SUBJECTIVE AND OBJECTIVE BOX
INTERVAL HPI/OVERNIGHT EVENTS:  History reviewed and patient examined; Patient with history of N/V for a number of weeks; Will need Lap Ethel; To get endoscopy today; Medical history positive for hypertension, dementia, diabetes, orthopedic procedures;  Thru her daughter since patient does not speak English there is no history of cardiac disease; There is some pulmonary problem likely Asthma which she uses inhalers.  No chest pain or shortness of breath at present time       MEDICATIONS  (STANDING):  ATENolol  Tablet 50 milliGRAM(s) Oral daily  atorvastatin 20 milliGRAM(s) Oral at bedtime  budesonide 160 MICROgram(s)/formoterol 4.5 MICROgram(s) Inhaler 2 Puff(s) Inhalation two times a day  dextrose 5%. 1000 milliLiter(s) (50 mL/Hr) IV Continuous <Continuous>  dextrose 50% Injectable 12.5 Gram(s) IV Push once  dextrose 50% Injectable 25 Gram(s) IV Push once  dextrose 50% Injectable 25 Gram(s) IV Push once  heparin  Injectable 5000 Unit(s) SubCutaneous every 8 hours  insulin lispro (HumaLOG) corrective regimen sliding scale   SubCutaneous Before meals and at bedtime  lactated ringers. 1000 milliLiter(s) (30 mL/Hr) IV Continuous <Continuous>  losartan 100 milliGRAM(s) Oral daily  pantoprazole    Tablet 40 milliGRAM(s) Oral before breakfast  tiotropium 18 MICROgram(s) Capsule 1 Capsule(s) Inhalation daily  traZODone 50 milliGRAM(s) Oral daily    MEDICATIONS  (PRN):  acetaminophen   Tablet .. 650 milliGRAM(s) Oral every 6 hours PRN Severe Pain (7 - 10)  dextrose 40% Gel 15 Gram(s) Oral once PRN Blood Glucose LESS THAN 70 milliGRAM(s)/deciliter  glucagon  Injectable 1 milliGRAM(s) IntraMuscular once PRN Glucose LESS THAN 70 milligrams/deciliter      Allergies    No Known Allergies    Intolerances        Vital Signs Last 24 Hrs  T(C): 36.7 (2020 05:21), Max: 37 (2020 18:25)  T(F): 98.1 (2020 05:21), Max: 98.6 (2020 18:25)  HR: 51 (2020 05:21) (51 - 61)  BP: 151/70 (2020 05:21) (114/70 - 151/70)  BP(mean): --  RR: 18 (2020 05:21) (16 - 18)  SpO2: 96% (2020 05:21) (94% - 97%)          Constitutional:  Awake    Eyes: YARED    ENMT: Negative    Neck: Supple    Back:  no tenderness     Respiratory:  clear    Cardiovascular: S1 S2    Gastrointestinal: soft no pain    Genitourinary:    Extremities: no edema    Vascular:    Neurological:    Skin:    Lymph Nodes:             @ 07:01  -   @ 07:00  --------------------------------------------------------  IN: 180 mL / OUT: 650 mL / NET: -470 mL      LABS:                        11.8   6.96  )-----------( 306      ( 2020 06:24 )             36.3     02-26    144  |  106  |  8   ----------------------------<  103<H>  4.6   |  27  |  0.94    Ca    9.5      2020 06:24  Phos  3.7     -26  Mg     2.3         TPro  6.7  /  Alb  3.7  /  TBili  0.2  /  DBili  x   /  AST  17  /  ALT  12  /  AlkPhos  47  02-26    PT/INR - ( 2020 12:53 )   PT: 12.3 sec;   INR: 1.08          PTT - ( 2020 12:53 )  PTT:28.5 sec  Urinalysis Basic - ( 2020 16:41 )    Color: Yellow / Appearance: SL Cloudy / S.010 / pH: x  Gluc: x / Ketone: NEGATIVE  / Bili: Negative / Urobili: 0.2 E.U./dL   Blood: x / Protein: NEGATIVE mg/dL / Nitrite: NEGATIVE   Leuk Esterase: Large / RBC: < 5 /HPF / WBC > 10 /HPF   Sq Epi: x / Non Sq Epi: 5-10 /HPF / Bacteria: Present /HPF        RADIOLOGY & ADDITIONAL TESTS:

## 2020-02-26 NOTE — PROGRESS NOTE ADULT - ASSESSMENT
A/P: 88F PMH DM, HTN, dementia, TIAX2 (5yrs ago), arthritis, unknown colitis PSH of b/l knee arthroplasty p/w 3 wks of epigastric pain, nausea and emesis.    Tylenol for pain control  NPO w/sips, gentle hydration   F/U Final CT A/P read  Medicine consult w/ Dr. Hawkins for perioperative clearance and optimization  GI consult Dr. Hensley for EGD in setting of chronic epigastric pain  HSQ/SCDs for DVT ppx  CXR/EKG  AM labs

## 2020-02-26 NOTE — CONSULT NOTE ADULT - ATTENDING COMMENTS
Pt was seen and examined. Agree with the above. For EGD tomorrow.
Patient seen and examined; All studies reviewed including EKG and Chest film; She is cleared for OR;  Continue present BP medication;  Will follow nilda op;

## 2020-02-27 ENCOUNTER — TRANSCRIPTION ENCOUNTER (OUTPATIENT)
Age: 85
End: 2020-02-27

## 2020-02-27 ENCOUNTER — RESULT REVIEW (OUTPATIENT)
Age: 85
End: 2020-02-27

## 2020-02-27 LAB
ALBUMIN SERPL ELPH-MCNC: 3.3 G/DL — SIGNIFICANT CHANGE UP (ref 3.3–5)
ALP SERPL-CCNC: 43 U/L — SIGNIFICANT CHANGE UP (ref 40–120)
ALT FLD-CCNC: 10 U/L — SIGNIFICANT CHANGE UP (ref 10–45)
ANION GAP SERPL CALC-SCNC: 14 MMOL/L — SIGNIFICANT CHANGE UP (ref 5–17)
APTT BLD: 29.5 SEC — SIGNIFICANT CHANGE UP (ref 27.5–36.3)
AST SERPL-CCNC: 18 U/L — SIGNIFICANT CHANGE UP (ref 10–40)
BILIRUB SERPL-MCNC: 0.2 MG/DL — SIGNIFICANT CHANGE UP (ref 0.2–1.2)
BLD GP AB SCN SERPL QL: NEGATIVE — SIGNIFICANT CHANGE UP
BUN SERPL-MCNC: 7 MG/DL — SIGNIFICANT CHANGE UP (ref 7–23)
CALCIUM SERPL-MCNC: 9.1 MG/DL — SIGNIFICANT CHANGE UP (ref 8.4–10.5)
CHLORIDE SERPL-SCNC: 105 MMOL/L — SIGNIFICANT CHANGE UP (ref 96–108)
CO2 SERPL-SCNC: 23 MMOL/L — SIGNIFICANT CHANGE UP (ref 22–31)
CREAT SERPL-MCNC: 0.88 MG/DL — SIGNIFICANT CHANGE UP (ref 0.5–1.3)
GLUCOSE BLDC GLUCOMTR-MCNC: 77 MG/DL — SIGNIFICANT CHANGE UP (ref 70–99)
GLUCOSE BLDC GLUCOMTR-MCNC: 79 MG/DL — SIGNIFICANT CHANGE UP (ref 70–99)
GLUCOSE BLDC GLUCOMTR-MCNC: 82 MG/DL — SIGNIFICANT CHANGE UP (ref 70–99)
GLUCOSE BLDC GLUCOMTR-MCNC: 82 MG/DL — SIGNIFICANT CHANGE UP (ref 70–99)
GLUCOSE SERPL-MCNC: 82 MG/DL — SIGNIFICANT CHANGE UP (ref 70–99)
HCT VFR BLD CALC: 34.4 % — LOW (ref 34.5–45)
HGB BLD-MCNC: 11.1 G/DL — LOW (ref 11.5–15.5)
INR BLD: 1.01 — SIGNIFICANT CHANGE UP (ref 0.88–1.16)
MAGNESIUM SERPL-MCNC: 2.2 MG/DL — SIGNIFICANT CHANGE UP (ref 1.6–2.6)
MCHC RBC-ENTMCNC: 32 PG — SIGNIFICANT CHANGE UP (ref 27–34)
MCHC RBC-ENTMCNC: 32.3 GM/DL — SIGNIFICANT CHANGE UP (ref 32–36)
MCV RBC AUTO: 99.1 FL — SIGNIFICANT CHANGE UP (ref 80–100)
NRBC # BLD: 0 /100 WBCS — SIGNIFICANT CHANGE UP (ref 0–0)
PHOSPHATE SERPL-MCNC: 4.2 MG/DL — SIGNIFICANT CHANGE UP (ref 2.5–4.5)
PLATELET # BLD AUTO: 271 K/UL — SIGNIFICANT CHANGE UP (ref 150–400)
POTASSIUM SERPL-MCNC: 4.4 MMOL/L — SIGNIFICANT CHANGE UP (ref 3.5–5.3)
POTASSIUM SERPL-SCNC: 4.4 MMOL/L — SIGNIFICANT CHANGE UP (ref 3.5–5.3)
PROT SERPL-MCNC: 6.1 G/DL — SIGNIFICANT CHANGE UP (ref 6–8.3)
PROTHROM AB SERPL-ACNC: 11.5 SEC — SIGNIFICANT CHANGE UP (ref 10–12.9)
RBC # BLD: 3.47 M/UL — LOW (ref 3.8–5.2)
RBC # FLD: 12.8 % — SIGNIFICANT CHANGE UP (ref 10.3–14.5)
RH IG SCN BLD-IMP: POSITIVE — SIGNIFICANT CHANGE UP
SODIUM SERPL-SCNC: 142 MMOL/L — SIGNIFICANT CHANGE UP (ref 135–145)
WBC # BLD: 5.44 K/UL — SIGNIFICANT CHANGE UP (ref 3.8–10.5)
WBC # FLD AUTO: 5.44 K/UL — SIGNIFICANT CHANGE UP (ref 3.8–10.5)

## 2020-02-27 PROCEDURE — 88305 TISSUE EXAM BY PATHOLOGIST: CPT | Mod: 26

## 2020-02-27 RX ORDER — ONDANSETRON 8 MG/1
4 TABLET, FILM COATED ORAL EVERY 6 HOURS
Refills: 0 | Status: DISCONTINUED | OUTPATIENT
Start: 2020-02-27 | End: 2020-03-01

## 2020-02-27 RX ORDER — SENNA PLUS 8.6 MG/1
2 TABLET ORAL AT BEDTIME
Refills: 0 | Status: DISCONTINUED | OUTPATIENT
Start: 2020-02-27 | End: 2020-03-01

## 2020-02-27 RX ORDER — MULTIVIT WITH MIN/MFOLATE/K2 340-15/3 G
1 POWDER (GRAM) ORAL ONCE
Refills: 0 | Status: COMPLETED | OUTPATIENT
Start: 2020-02-27 | End: 2020-02-27

## 2020-02-27 RX ADMIN — SENNA PLUS 2 TABLET(S): 8.6 TABLET ORAL at 22:04

## 2020-02-27 RX ADMIN — BUDESONIDE AND FORMOTEROL FUMARATE DIHYDRATE 2 PUFF(S): 160; 4.5 AEROSOL RESPIRATORY (INHALATION) at 10:54

## 2020-02-27 RX ADMIN — Medication 1 BOTTLE: at 15:36

## 2020-02-27 RX ADMIN — ATORVASTATIN CALCIUM 20 MILLIGRAM(S): 80 TABLET, FILM COATED ORAL at 22:03

## 2020-02-27 RX ADMIN — HEPARIN SODIUM 5000 UNIT(S): 5000 INJECTION INTRAVENOUS; SUBCUTANEOUS at 05:38

## 2020-02-27 RX ADMIN — PANTOPRAZOLE SODIUM 40 MILLIGRAM(S): 20 TABLET, DELAYED RELEASE ORAL at 05:38

## 2020-02-27 RX ADMIN — HEPARIN SODIUM 5000 UNIT(S): 5000 INJECTION INTRAVENOUS; SUBCUTANEOUS at 22:03

## 2020-02-27 RX ADMIN — ATENOLOL 50 MILLIGRAM(S): 25 TABLET ORAL at 05:38

## 2020-02-27 RX ADMIN — ONDANSETRON 4 MILLIGRAM(S): 8 TABLET, FILM COATED ORAL at 19:53

## 2020-02-27 RX ADMIN — BUDESONIDE AND FORMOTEROL FUMARATE DIHYDRATE 2 PUFF(S): 160; 4.5 AEROSOL RESPIRATORY (INHALATION) at 22:03

## 2020-02-27 NOTE — PROGRESS NOTE ADULT - SUBJECTIVE AND OBJECTIVE BOX
SUBJECTIVE: Patient seen and examined bedside by chief resident.    ATENolol  Tablet 50 milliGRAM(s) Oral daily  heparin  Injectable 5000 Unit(s) SubCutaneous every 8 hours  losartan 100 milliGRAM(s) Oral daily    MEDICATIONS  (PRN):  acetaminophen   Tablet .. 650 milliGRAM(s) Oral every 6 hours PRN Severe Pain (7 - 10)  dextrose 40% Gel 15 Gram(s) Oral once PRN Blood Glucose LESS THAN 70 milliGRAM(s)/deciliter  glucagon  Injectable 1 milliGRAM(s) IntraMuscular once PRN Glucose LESS THAN 70 milligrams/deciliter      I&O's Detail    2020 07:01  -  2020 07:00  --------------------------------------------------------  IN:    lactated ringers.: 300 mL  Total IN: 300 mL    OUT:    Voided: 1050 mL  Total OUT: 1050 mL    Total NET: -750 mL          Vital Signs Last 24 Hrs  T(C): 36.6 (2020 05:22), Max: 36.6 (2020 05:22)  T(F): 97.8 (2020 05:22), Max: 97.8 (2020 05:22)  HR: 61 (2020 05:22) (51 - 61)  BP: 159/85 (2020 05:22) (135/75 - 164/74)  BP(mean): --  RR: 18 (2020 05:22) (16 - 18)  SpO2: 95% (2020 05:22) (94% - 95%)    General: NAD, resting comfortably in bed  C/V: NSR  Pulm: Nonlabored breathing, no respiratory distress  Abd: soft, nondistended, slight TTP epigastrum/RUQ      LABS:                        11.1   5.44  )-----------( 271      ( 2020 06:01 )             34.4     02-27    142  |  105  |  7   ----------------------------<  82  4.4   |  23  |  0.88    Ca    9.1      2020 06:01  Phos  4.2     -  Mg     2.2         TPro  6.1  /  Alb  3.3  /  TBili  0.2  /  DBili  x   /  AST  18  /  ALT  10  /  AlkPhos  43  -    PT/INR - ( 2020 06:01 )   PT: 11.5 sec;   INR: 1.01          PTT - ( 2020 06:01 )  PTT:29.5 sec  Urinalysis Basic - ( 2020 16:41 )    Color: Yellow / Appearance: SL Cloudy / S.010 / pH: x  Gluc: x / Ketone: NEGATIVE  / Bili: Negative / Urobili: 0.2 E.U./dL   Blood: x / Protein: NEGATIVE mg/dL / Nitrite: NEGATIVE   Leuk Esterase: Large / RBC: < 5 /HPF / WBC > 10 /HPF   Sq Epi: x / Non Sq Epi: 5-10 /HPF / Bacteria: Present /HPF        RADIOLOGY & ADDITIONAL STUDIES:  CT Abdomen and Pelvis w/ IV Cont:   EXAM:  CT ABDOMEN AND PELVIS IC                          PROCEDURE DATE:  2020          INTERPRETATION:  CT SCAN OF ABDOMEN AND PELVIS    History: Right upper quadrant pain; evaluate for cholecystitis    Technique: CT scan of abdomen and pelvis was performed from lung bases through symphysis pubis. Intravenous and oral contrast material were utilized. Axial, sagittal and coronal reformatted images were reviewed.    Comparison: Ultrasound of the right upper quadrant dated 2020    Findings:  Evaluation is limited secondary to artifact from patient's right arm, which is unable to be raised secondary to pain.    Lower chest: Calcified granuloma within the right middle lobe. Multiple solid nodules within the left lower lobe measuring 5 mm and 6 mm (3:16, and 3:13).    Liver:  Patent hepatic dome calcification compatible the sequela of remote granulomatous disease.    Gallbladder: Multiple radiopaque stones, consistent with cholelithiasis. The largest measures 0.9 cm.    Spleen:  Normal.    Pancreas:  Normal.    Adrenal glands:  Normal.    Kidneys: 1.5 cm interpolar left renal cyst. Multiple bilateral subcentimeter hypoattenuating lesions, too small to characterize.    Adenopathy:  No lymphadenopathy in abdomen or pelvis.    Ascites: None.    Gastrointestinal tract: Evaluation is limited without the use of oral contrast. No evidence of obstruction or free air.    Vessels: Mild calcified and noncalcified plaque in the descending abdominal aorta, which becomes moderate in severity at the bifurcation.    Pelvic organs: Evaluation is limited secondary to metallic artifact. Bladder is normal. There is a 0.8 cm calcified uterine fibroid and a smaller subcentimeter fibroid. A pessary is visualized. No adnexal masses. There is mild bladder wall thickening despite suboptimal distention and a cystitis cannot be excluded.    Soft tissues: Small fat-containing umbilical hernia.    Bones: Post left total hip arthroplasty.. There is multilevel degenerative disc change.    Impression:     1.  Cholelithiasis, without CT findings suggestive of acute cholecystitis as noted on prior imaging upper quadrant ultrasound dated 2020  2.  Multiple subcentimeter solid nodules within the left lower lobe measuring up to 6 mm. In both high and low risk individual interval surveillance in 6-12 months and then again at 18-24 months is suggested to confirm stability.  3.  Question mild bladder wall thickening. Cystitis in the proper clinical setting cannot be excluded. Correlation withurinalysis.                      Thank you for the opportunity to participate in the care of this patient.    PEE CALDERÓN M.D., RADIOLOGY RESIDENT  This document has been electronically signed.  OMID HAWTHORNE M.D., ATTENDING RADIOLOGIST  Thisdocument has been electronically signed. 2020  7:05PM               (20 @ 17:33)

## 2020-02-27 NOTE — PROGRESS NOTE ADULT - ASSESSMENT
A/P: 88F PMH DM, HTN, dementia, TIAX2 (5yrs ago), arthritis, unknown colitis PSH of b/l knee arthroplasty p/w 3 wks of epigastric pain, nausea and emesis.    Tylenol for pain control  NPO w/sips, gentle hydration   HSQ/SCDs for DVT ppx  OOBA/IS  AM labs  Awaiting EGD

## 2020-02-27 NOTE — PHYSICAL THERAPY INITIAL EVALUATION ADULT - PATIENT/FAMILY/SIGNIFICANT OTHER GOALS STATEMENT, PT EVAL
Patient is willing and motivated to participate in physical therapy and would like to get up and walk to get stronger

## 2020-02-27 NOTE — PHYSICAL THERAPY INITIAL EVALUATION ADULT - ADDITIONAL COMMENTS
Patient lives in an elevator apartment with no steps to enter. Patient has home health aides Sun-Mon from 8-5PM daily. Daughter states someone is always around, whether it be family or aides to assist. Denies history of falls

## 2020-02-27 NOTE — PHYSICAL THERAPY INITIAL EVALUATION ADULT - PERTINENT HX OF CURRENT PROBLEM, REHAB EVAL
88F PMH DM, HTN, dementia, TIAX2 (5yrs ago), arthritis, unknown colitis PSH of b/l knee arthroplasty p/w 3 wks of RUQ pain, nausea and emesis. Per pt and daughter at bedside she has had achey intermittent RUQ pain for the past 3 wks, worsening after meals and associated with nausea and episodes of small bilious emes every 3-4 days (last episode 2 days ago).

## 2020-02-27 NOTE — PHYSICAL THERAPY INITIAL EVALUATION ADULT - GENERAL OBSERVATIONS, REHAB EVAL
Received walking in [place] with IV pole assist with daughter at side, +IV, on room air, in no apparent distress. Patient appears to be resting comfortably in bed

## 2020-02-27 NOTE — PROGRESS NOTE ADULT - SUBJECTIVE AND OBJECTIVE BOX
Doing ok, waiting for endoscopy. Still feel nauseous after eating and discomfort in  the upper abdomen. After endoscopy will decide about lap george.

## 2020-02-27 NOTE — PHYSICAL THERAPY INITIAL EVALUATION ADULT - CRITERIA FOR SKILLED THERAPEUTIC INTERVENTIONS
risk reduction/prevention/functional limitations in following categories/impairments found/anticipated discharge recommendation/rehab potential/therapy frequency

## 2020-02-28 ENCOUNTER — TRANSCRIPTION ENCOUNTER (OUTPATIENT)
Age: 85
End: 2020-02-28

## 2020-02-28 ENCOUNTER — RESULT REVIEW (OUTPATIENT)
Age: 85
End: 2020-02-28

## 2020-02-28 LAB
ALBUMIN SERPL ELPH-MCNC: 3 G/DL — LOW (ref 3.3–5)
ALP SERPL-CCNC: 39 U/L — LOW (ref 40–120)
ALT FLD-CCNC: 9 U/L — LOW (ref 10–45)
ANION GAP SERPL CALC-SCNC: 12 MMOL/L — SIGNIFICANT CHANGE UP (ref 5–17)
APTT BLD: 29.3 SEC — SIGNIFICANT CHANGE UP (ref 27.5–36.3)
AST SERPL-CCNC: 14 U/L — SIGNIFICANT CHANGE UP (ref 10–40)
BILIRUB SERPL-MCNC: 0.2 MG/DL — SIGNIFICANT CHANGE UP (ref 0.2–1.2)
BUN SERPL-MCNC: 7 MG/DL — SIGNIFICANT CHANGE UP (ref 7–23)
CALCIUM SERPL-MCNC: 8.7 MG/DL — SIGNIFICANT CHANGE UP (ref 8.4–10.5)
CHLORIDE SERPL-SCNC: 102 MMOL/L — SIGNIFICANT CHANGE UP (ref 96–108)
CO2 SERPL-SCNC: 24 MMOL/L — SIGNIFICANT CHANGE UP (ref 22–31)
CREAT SERPL-MCNC: 0.86 MG/DL — SIGNIFICANT CHANGE UP (ref 0.5–1.3)
GLUCOSE BLDC GLUCOMTR-MCNC: 112 MG/DL — HIGH (ref 70–99)
GLUCOSE BLDC GLUCOMTR-MCNC: 118 MG/DL — HIGH (ref 70–99)
GLUCOSE BLDC GLUCOMTR-MCNC: 122 MG/DL — HIGH (ref 70–99)
GLUCOSE BLDC GLUCOMTR-MCNC: 142 MG/DL — HIGH (ref 70–99)
GLUCOSE BLDC GLUCOMTR-MCNC: 64 MG/DL — LOW (ref 70–99)
GLUCOSE SERPL-MCNC: 77 MG/DL — SIGNIFICANT CHANGE UP (ref 70–99)
HCT VFR BLD CALC: 31.1 % — LOW (ref 34.5–45)
HGB BLD-MCNC: 10.2 G/DL — LOW (ref 11.5–15.5)
INR BLD: 1.04 — SIGNIFICANT CHANGE UP (ref 0.88–1.16)
MAGNESIUM SERPL-MCNC: 2.1 MG/DL — SIGNIFICANT CHANGE UP (ref 1.6–2.6)
MCHC RBC-ENTMCNC: 32.2 PG — SIGNIFICANT CHANGE UP (ref 27–34)
MCHC RBC-ENTMCNC: 32.8 GM/DL — SIGNIFICANT CHANGE UP (ref 32–36)
MCV RBC AUTO: 98.1 FL — SIGNIFICANT CHANGE UP (ref 80–100)
NRBC # BLD: 0 /100 WBCS — SIGNIFICANT CHANGE UP (ref 0–0)
PHOSPHATE SERPL-MCNC: 3.3 MG/DL — SIGNIFICANT CHANGE UP (ref 2.5–4.5)
PLATELET # BLD AUTO: 237 K/UL — SIGNIFICANT CHANGE UP (ref 150–400)
POTASSIUM SERPL-MCNC: 3.9 MMOL/L — SIGNIFICANT CHANGE UP (ref 3.5–5.3)
POTASSIUM SERPL-SCNC: 3.9 MMOL/L — SIGNIFICANT CHANGE UP (ref 3.5–5.3)
PROT SERPL-MCNC: 5.9 G/DL — LOW (ref 6–8.3)
PROTHROM AB SERPL-ACNC: 11.9 SEC — SIGNIFICANT CHANGE UP (ref 10–12.9)
RBC # BLD: 3.17 M/UL — LOW (ref 3.8–5.2)
RBC # FLD: 12.4 % — SIGNIFICANT CHANGE UP (ref 10.3–14.5)
SODIUM SERPL-SCNC: 138 MMOL/L — SIGNIFICANT CHANGE UP (ref 135–145)
WBC # BLD: 5.13 K/UL — SIGNIFICANT CHANGE UP (ref 3.8–10.5)
WBC # FLD AUTO: 5.13 K/UL — SIGNIFICANT CHANGE UP (ref 3.8–10.5)

## 2020-02-28 PROCEDURE — 88304 TISSUE EXAM BY PATHOLOGIST: CPT | Mod: 26

## 2020-02-28 RX ORDER — OXYCODONE AND ACETAMINOPHEN 5; 325 MG/1; MG/1
2 TABLET ORAL EVERY 4 HOURS
Refills: 0 | Status: DISCONTINUED | OUTPATIENT
Start: 2020-02-28 | End: 2020-03-01

## 2020-02-28 RX ORDER — DEXTROSE 50 % IN WATER 50 %
25 SYRINGE (ML) INTRAVENOUS ONCE
Refills: 0 | Status: COMPLETED | OUTPATIENT
Start: 2020-02-28 | End: 2020-02-28

## 2020-02-28 RX ORDER — HYDROMORPHONE HYDROCHLORIDE 2 MG/ML
0.5 INJECTION INTRAMUSCULAR; INTRAVENOUS; SUBCUTANEOUS EVERY 4 HOURS
Refills: 0 | Status: DISCONTINUED | OUTPATIENT
Start: 2020-02-28 | End: 2020-03-01

## 2020-02-28 RX ORDER — OXYCODONE AND ACETAMINOPHEN 5; 325 MG/1; MG/1
1 TABLET ORAL EVERY 4 HOURS
Refills: 0 | Status: DISCONTINUED | OUTPATIENT
Start: 2020-02-28 | End: 2020-03-01

## 2020-02-28 RX ADMIN — OXYCODONE AND ACETAMINOPHEN 2 TABLET(S): 5; 325 TABLET ORAL at 19:15

## 2020-02-28 RX ADMIN — PANTOPRAZOLE SODIUM 40 MILLIGRAM(S): 20 TABLET, DELAYED RELEASE ORAL at 06:51

## 2020-02-28 RX ADMIN — HEPARIN SODIUM 5000 UNIT(S): 5000 INJECTION INTRAVENOUS; SUBCUTANEOUS at 15:14

## 2020-02-28 RX ADMIN — HYDROMORPHONE HYDROCHLORIDE 0.5 MILLIGRAM(S): 2 INJECTION INTRAMUSCULAR; INTRAVENOUS; SUBCUTANEOUS at 15:12

## 2020-02-28 RX ADMIN — ATENOLOL 50 MILLIGRAM(S): 25 TABLET ORAL at 06:51

## 2020-02-28 RX ADMIN — OXYCODONE AND ACETAMINOPHEN 2 TABLET(S): 5; 325 TABLET ORAL at 20:30

## 2020-02-28 RX ADMIN — LOSARTAN POTASSIUM 100 MILLIGRAM(S): 100 TABLET, FILM COATED ORAL at 06:51

## 2020-02-28 RX ADMIN — Medication 25 MILLILITER(S): at 07:50

## 2020-02-28 RX ADMIN — SENNA PLUS 2 TABLET(S): 8.6 TABLET ORAL at 22:13

## 2020-02-28 RX ADMIN — HEPARIN SODIUM 5000 UNIT(S): 5000 INJECTION INTRAVENOUS; SUBCUTANEOUS at 22:13

## 2020-02-28 RX ADMIN — Medication 0: at 17:16

## 2020-02-28 RX ADMIN — ATORVASTATIN CALCIUM 20 MILLIGRAM(S): 80 TABLET, FILM COATED ORAL at 22:13

## 2020-02-28 RX ADMIN — HYDROMORPHONE HYDROCHLORIDE 0.5 MILLIGRAM(S): 2 INJECTION INTRAMUSCULAR; INTRAVENOUS; SUBCUTANEOUS at 14:32

## 2020-02-28 RX ADMIN — BUDESONIDE AND FORMOTEROL FUMARATE DIHYDRATE 2 PUFF(S): 160; 4.5 AEROSOL RESPIRATORY (INHALATION) at 22:14

## 2020-02-28 RX ADMIN — HEPARIN SODIUM 5000 UNIT(S): 5000 INJECTION INTRAVENOUS; SUBCUTANEOUS at 06:51

## 2020-02-28 NOTE — DISCHARGE NOTE PROVIDER - HOSPITAL COURSE
88F PMH DM, HTN, dementia, TIAX2 (5yrs ago), arthritis, unknown colitis PSH of b/l knee arthroplasty p/w 3 wks of epigastric pain, nausea and emesis. Per pt and daughter at bedside she has had achey intermittent RUQ pain for the past 3 wks, worsening after meals and associated with nausea and episodes of small bilious emes every 3-4 days (last episode 2 days ago). Pt otherwise denies fever/chills/chest pain/dyspnea, reports constipation for the past few wks (last BM 3 days ago), passing flatus daily, voiding regularly without dysuria/pyuria. Pt reports unknown type of colitis and is followed by GI Dr. Carlson, but denies personal/fam hx of UC/Crohn's disease. Last cscope and EGD both about 5 yrs ago and normal per pt.  On 2/27 the patient had an EGD which showed some mild gastritis, On 2/28 the patient went to the OR for laparoscopic cholecystectomy. The patient tolerated the procedure well and there were no complications. Post operatively the patients diet was advanced which was tolerated. The patient is being discharged to home and is to follow up in 1-2 weeks. 88F PMH DM, HTN, dementia, TIAX2 (5yrs ago), arthritis, unknown colitis PSH of b/l knee arthroplasty p/w 3 wks of epigastric pain, nausea and emesis. Per pt and daughter at bedside she has had achey intermittent RUQ pain for the past 3 wks, worsening after meals and associated with nausea and episodes of small bilious emes every 3-4 days (last episode 2 days ago). Pt otherwise denies fever/chills/chest pain/dyspnea, reports constipation for the past few wks (last BM 3 days ago), passing flatus daily, voiding regularly without dysuria/pyuria. Pt reports unknown type of colitis and is followed by GI Dr. Carlson, but denies personal/fam hx of UC/Crohn's disease. Last cscope and EGD both about 5 yrs ago and normal per pt.  On 2/27 the patient had an EGD which showed some mild gastritis, On 2/28 the patient went to the OR for laparoscopic cholecystectomy. The patient tolerated the procedure well and there were no complications. Post operatively the patients diet was advanced which was tolerated. LFTs normalized. The patient is being discharged to home and is to follow up in 1-2 weeks.

## 2020-02-28 NOTE — PROGRESS NOTE ADULT - ASSESSMENT
A/P: 88y Female s/p above procedure  Diet: CLD  IVF: LR  Pain/nausea control  TOV @11pm  SQH/SCDs/OOBA/IS  Dispo pending pain control, PO tolerance, clinical improvement

## 2020-02-28 NOTE — DISCHARGE NOTE PROVIDER - CARE PROVIDER_API CALL
Jim Connors)  Surgery  [address], [address]  [place] [address]  Phone: [phone]  Fax: [phone]  Follow Up Time:

## 2020-02-28 NOTE — PROGRESS NOTE ADULT - SUBJECTIVE AND OBJECTIVE BOX
SUBJECTIVE: Patient seen and examined bedside. Patient denies abdominal pain currently. Patient going to the OR for lap george today    ATENolol  Tablet 50 milliGRAM(s) Oral daily  heparin  Injectable 5000 Unit(s) SubCutaneous every 8 hours  losartan 100 milliGRAM(s) Oral daily      Vital Signs Last 24 Hrs  T(C): 36.8 (28 Feb 2020 07:13), Max: 36.8 (28 Feb 2020 05:29)  T(F): 98.3 (28 Feb 2020 07:13), Max: 98.3 (28 Feb 2020 05:29)  HR: 50 (28 Feb 2020 07:13) (50 - 63)  BP: 150/72 (28 Feb 2020 07:13) (128/78 - 163/83)  BP(mean): --  RR: 17 (28 Feb 2020 07:13) (16 - 18)  SpO2: 94% (28 Feb 2020 07:13) (94% - 96%)  I&O's Detail    27 Feb 2020 07:01  -  28 Feb 2020 07:00  --------------------------------------------------------  IN:    lactated ringers.: 750 mL    Oral Fluid: 330 mL  Total IN: 1080 mL    OUT:    Voided: 550 mL  Total OUT: 550 mL    Total NET: 530 mL          General: NAD, resting comfortably in bed  C/V: NSR  Pulm: Nonlabored breathing, no respiratory distress  Abd: soft, NT/ND.  Extrem: WWP, no edema, SCDs in place        LABS:                        10.2   5.13  )-----------( 237      ( 28 Feb 2020 06:28 )             31.1     02-28    138  |  102  |  7   ----------------------------<  77  3.9   |  24  |  0.86    Ca    8.7      28 Feb 2020 06:28  Phos  3.3     02-28  Mg     2.1     02-28    TPro  5.9<L>  /  Alb  3.0<L>  /  TBili  0.2  /  DBili  x   /  AST  14  /  ALT  9<L>  /  AlkPhos  39<L>  02-28    PT/INR - ( 28 Feb 2020 06:28 )   PT: 11.9 sec;   INR: 1.04          PTT - ( 28 Feb 2020 06:28 )  PTT:29.3 sec      RADIOLOGY & ADDITIONAL STUDIES:        RADIOLOGY & ADDITIONAL TESTS:

## 2020-02-28 NOTE — DIETITIAN INITIAL EVALUATION ADULT. - ENERGY NEEDS
Height: 64" Weight: 180.9lbs, IBW 120lbs+/-10%, %.8%, BMI 30.0 kg/m2  IBW used for calculations as pt >120% of IBW Needs adjusted for advanced age.

## 2020-02-28 NOTE — DIETITIAN INITIAL EVALUATION ADULT. - ADD RECOMMEND
1. NPO for OR 2. Recommend advance to low fat, cst cho diet with evening snack when medically feasible 3. RD diet education reenforcement prn

## 2020-02-28 NOTE — DISCHARGE NOTE PROVIDER - NSDCMRMEDTOKEN_GEN_ALL_CORE_FT
Advair  mcg-21 mcg/inh inhalation aerosol: 2 puff(s) inhaled 2 times a day  Aggrenox: 1 tab(s) orally 2 times a day  Benicar: 1 tab(s) orally once a day  bisoprolol 5 mg oral tablet: 1 tab(s) orally once a day  Calcium 600+D 600 mg-200 units oral tablet:  orally 2 times a day  Creon 36,000 units oral delayed release capsule: 1 cap(s) orally 3 times a day  Crestor: 20 milligram(s) orally once a day (at bedtime)  furosemide 40 mg oral tablet: 1 tab(s) orally once a day  Januvia: 50 milligram(s) orally once a day  levocetirizine 5 mg oral tablet: 1 tab(s) orally once a day (in the evening)  Lovaza: 2 cap(s) orally 2 times a day  Namenda: 10 milligram(s) orally 2 times a day  Prevacid: 30 milligram(s) orally once a day  Spiriva 18 mcg inhalation capsule: 1 cap(s) inhaled once a day  Tradjenta 5 mg oral tablet: 1 tab(s) orally once a day  traZODone 50 mg oral tablet: 1 tab(s) orally 2 times a day  Zoloft 25 mg oral tablet: 1 tab(s) orally once a day Advair  mcg-21 mcg/inh inhalation aerosol: 2 puff(s) inhaled 2 times a day  Aggrenox: 1 tab(s) orally 2 times a day  Benicar: 1 tab(s) orally once a day  bisoprolol 5 mg oral tablet: 1 tab(s) orally once a day  Calcium 600+D 600 mg-200 units oral tablet:  orally 2 times a day  Colace 100 mg oral capsule: 1 cap(s) orally 2 times a day, As Needed -for constipation MDD:2   Crestor: 20 milligram(s) orally once a day (at bedtime)  furosemide 40 mg oral tablet: 1 tab(s) orally once a day  Januvia: 50 milligram(s) orally once a day  levocetirizine 5 mg oral tablet: 1 tab(s) orally once a day (in the evening)  Lovaza: 2 cap(s) orally 2 times a day  Namenda: 10 milligram(s) orally 2 times a day  Percocet 5/325 oral tablet: 1 tab(s) orally every 6 hours, As Needed -for severe pain MDD:4   Prevacid: 30 milligram(s) orally once a day  Spiriva 18 mcg inhalation capsule: 1 cap(s) inhaled once a day  Tradjenta 5 mg oral tablet: 1 tab(s) orally once a day  Zoloft 25 mg oral tablet: 1 tab(s) orally once a day

## 2020-02-28 NOTE — PRE-OP CHECKLIST - SELECT TESTS ORDERED
BMP/POCT Blood Glucose/Type and Screen/CBC/CMP/Urinalysis/EKG/PT/PTT/INR/CXR POCT Blood Glucose/CXR/CBC/PT/PTT/BMP/CMP/Urinalysis/Type and Screen/INR/EKG/64 (given d50)

## 2020-02-28 NOTE — PROGRESS NOTE ADULT - ASSESSMENT
A/P: 88F PMH DM, HTN, dementia, TIAX2 (5yrs ago), arthritis, unknown colitis PSH of b/l knee arthroplasty p/w 3 wks of epigastric pain, nausea and emesis s/p EGD which found mild gastritis in antrum. bx sent    Tylenol for pain control  NPO, gentle hydration   HSQ/SCDs for DVT ppx  OOBA/IS  AM labs  OR for lap george today

## 2020-02-28 NOTE — DIETITIAN INITIAL EVALUATION ADULT. - OTHER INFO
88F with hx of DM, HTN, dementia, TIAX2 (5yrs ago), arthritis, unknown colitis PSH of b/l knee arthroplasty p/w 3 wks of RUQ pain, nausea and emesis. s/p EGD which found mild gastritis in antrum. bx sent. S/p Lap george (2/28), pt remains out of room at this time in post surgical holding. On assessment, daughter awaiting in room. Education provided on DM diet (which is well controlled) and Low fat diet re: s/p lap george. Pt daughter appears receptive. Daughter noted good appetite PTA. UBW has remained consistent at home (~180lbs) following DM diet at home. NKFA. GI: WDL, last BM 2/25. Pain: denied this am per flowsheet. SkinL WDL, kerwin score 20. Please see nutrition recommendations below. RD to follow  up per protocol.

## 2020-02-28 NOTE — DISCHARGE NOTE PROVIDER - NSDCFUADDINST_GEN_ALL_CORE_FT
Follow up with Dr. Connors in 1-2 weeks. Call the office at [phone] to schedule your appointment. Instructions for follow-up, activity and diet. Please resume all regular home medications unless specifically advised not to take a particular medication. Also, please take any new medications as prescribed.    Please get plenty of rest, continue to ambulate several times per day, and drink adequate amounts of fluids. Avoid lifting weights greater than 5-10 lbs until you follow-up with your surgeon, who will instruct you further regarding activity restrictions. Avoid driving or operating heavy machinery while taking pain medications. You may shower letting soap and water run over incisions. Pat dry with clean towel when finished.    Call the office if you experience increasing abdominal pain, nausea, vomiting, or temperature >101 F.

## 2020-02-29 LAB
ALBUMIN SERPL ELPH-MCNC: 3.1 G/DL — LOW (ref 3.3–5)
ALP SERPL-CCNC: 40 U/L — SIGNIFICANT CHANGE UP (ref 40–120)
ALT FLD-CCNC: 20 U/L — SIGNIFICANT CHANGE UP (ref 10–45)
ANION GAP SERPL CALC-SCNC: 16 MMOL/L — SIGNIFICANT CHANGE UP (ref 5–17)
AST SERPL-CCNC: 34 U/L — SIGNIFICANT CHANGE UP (ref 10–40)
BILIRUB DIRECT SERPL-MCNC: <0.2 MG/DL — SIGNIFICANT CHANGE UP (ref 0–0.2)
BILIRUB SERPL-MCNC: 0.2 MG/DL — SIGNIFICANT CHANGE UP (ref 0.2–1.2)
BUN SERPL-MCNC: 16 MG/DL — SIGNIFICANT CHANGE UP (ref 7–23)
CALCIUM SERPL-MCNC: 8.8 MG/DL — SIGNIFICANT CHANGE UP (ref 8.4–10.5)
CHLORIDE SERPL-SCNC: 100 MMOL/L — SIGNIFICANT CHANGE UP (ref 96–108)
CO2 SERPL-SCNC: 23 MMOL/L — SIGNIFICANT CHANGE UP (ref 22–31)
CREAT SERPL-MCNC: 1.26 MG/DL — SIGNIFICANT CHANGE UP (ref 0.5–1.3)
GLUCOSE BLDC GLUCOMTR-MCNC: 101 MG/DL — HIGH (ref 70–99)
GLUCOSE BLDC GLUCOMTR-MCNC: 114 MG/DL — HIGH (ref 70–99)
GLUCOSE BLDC GLUCOMTR-MCNC: 130 MG/DL — HIGH (ref 70–99)
GLUCOSE BLDC GLUCOMTR-MCNC: 88 MG/DL — SIGNIFICANT CHANGE UP (ref 70–99)
GLUCOSE SERPL-MCNC: 87 MG/DL — SIGNIFICANT CHANGE UP (ref 70–99)
HCT VFR BLD CALC: 34.1 % — LOW (ref 34.5–45)
HGB BLD-MCNC: 10.8 G/DL — LOW (ref 11.5–15.5)
MAGNESIUM SERPL-MCNC: 2.4 MG/DL — SIGNIFICANT CHANGE UP (ref 1.6–2.6)
MCHC RBC-ENTMCNC: 31.7 GM/DL — LOW (ref 32–36)
MCHC RBC-ENTMCNC: 31.9 PG — SIGNIFICANT CHANGE UP (ref 27–34)
MCV RBC AUTO: 100.6 FL — HIGH (ref 80–100)
NRBC # BLD: 0 /100 WBCS — SIGNIFICANT CHANGE UP (ref 0–0)
PHOSPHATE SERPL-MCNC: 5.3 MG/DL — HIGH (ref 2.5–4.5)
PLATELET # BLD AUTO: 258 K/UL — SIGNIFICANT CHANGE UP (ref 150–400)
POTASSIUM SERPL-MCNC: 4.8 MMOL/L — SIGNIFICANT CHANGE UP (ref 3.5–5.3)
POTASSIUM SERPL-SCNC: 4.8 MMOL/L — SIGNIFICANT CHANGE UP (ref 3.5–5.3)
PROT SERPL-MCNC: 6 G/DL — SIGNIFICANT CHANGE UP (ref 6–8.3)
RBC # BLD: 3.39 M/UL — LOW (ref 3.8–5.2)
RBC # FLD: 12.6 % — SIGNIFICANT CHANGE UP (ref 10.3–14.5)
SODIUM SERPL-SCNC: 139 MMOL/L — SIGNIFICANT CHANGE UP (ref 135–145)
WBC # BLD: 8.47 K/UL — SIGNIFICANT CHANGE UP (ref 3.8–10.5)
WBC # FLD AUTO: 8.47 K/UL — SIGNIFICANT CHANGE UP (ref 3.8–10.5)

## 2020-02-29 RX ORDER — FUROSEMIDE 40 MG
40 TABLET ORAL DAILY
Refills: 0 | Status: DISCONTINUED | OUTPATIENT
Start: 2020-02-29 | End: 2020-02-29

## 2020-02-29 RX ORDER — SODIUM CHLORIDE 9 MG/ML
500 INJECTION, SOLUTION INTRAVENOUS ONCE
Refills: 0 | Status: COMPLETED | OUTPATIENT
Start: 2020-02-29 | End: 2020-02-29

## 2020-02-29 RX ORDER — SODIUM CHLORIDE 9 MG/ML
1000 INJECTION, SOLUTION INTRAVENOUS
Refills: 0 | Status: DISCONTINUED | OUTPATIENT
Start: 2020-02-29 | End: 2020-02-29

## 2020-02-29 RX ORDER — SODIUM CHLORIDE 9 MG/ML
1000 INJECTION, SOLUTION INTRAVENOUS
Refills: 0 | Status: DISCONTINUED | OUTPATIENT
Start: 2020-02-29 | End: 2020-03-01

## 2020-02-29 RX ORDER — SODIUM CHLORIDE 9 MG/ML
250 INJECTION, SOLUTION INTRAVENOUS ONCE
Refills: 0 | Status: COMPLETED | OUTPATIENT
Start: 2020-02-29 | End: 2020-02-29

## 2020-02-29 RX ADMIN — ATORVASTATIN CALCIUM 20 MILLIGRAM(S): 80 TABLET, FILM COATED ORAL at 21:20

## 2020-02-29 RX ADMIN — SENNA PLUS 2 TABLET(S): 8.6 TABLET ORAL at 21:20

## 2020-02-29 RX ADMIN — OXYCODONE AND ACETAMINOPHEN 1 TABLET(S): 5; 325 TABLET ORAL at 04:57

## 2020-02-29 RX ADMIN — OXYCODONE AND ACETAMINOPHEN 1 TABLET(S): 5; 325 TABLET ORAL at 10:37

## 2020-02-29 RX ADMIN — SODIUM CHLORIDE 250 MILLILITER(S): 9 INJECTION, SOLUTION INTRAVENOUS at 04:00

## 2020-02-29 RX ADMIN — HEPARIN SODIUM 5000 UNIT(S): 5000 INJECTION INTRAVENOUS; SUBCUTANEOUS at 06:25

## 2020-02-29 RX ADMIN — OXYCODONE AND ACETAMINOPHEN 1 TABLET(S): 5; 325 TABLET ORAL at 09:37

## 2020-02-29 RX ADMIN — PANTOPRAZOLE SODIUM 40 MILLIGRAM(S): 20 TABLET, DELAYED RELEASE ORAL at 06:24

## 2020-02-29 RX ADMIN — LOSARTAN POTASSIUM 100 MILLIGRAM(S): 100 TABLET, FILM COATED ORAL at 06:25

## 2020-02-29 RX ADMIN — BUDESONIDE AND FORMOTEROL FUMARATE DIHYDRATE 2 PUFF(S): 160; 4.5 AEROSOL RESPIRATORY (INHALATION) at 21:28

## 2020-02-29 RX ADMIN — SODIUM CHLORIDE 1000 MILLILITER(S): 9 INJECTION, SOLUTION INTRAVENOUS at 13:39

## 2020-02-29 RX ADMIN — OXYCODONE AND ACETAMINOPHEN 2 TABLET(S): 5; 325 TABLET ORAL at 02:05

## 2020-02-29 RX ADMIN — OXYCODONE AND ACETAMINOPHEN 1 TABLET(S): 5; 325 TABLET ORAL at 05:27

## 2020-02-29 RX ADMIN — HEPARIN SODIUM 5000 UNIT(S): 5000 INJECTION INTRAVENOUS; SUBCUTANEOUS at 16:33

## 2020-02-29 RX ADMIN — HEPARIN SODIUM 5000 UNIT(S): 5000 INJECTION INTRAVENOUS; SUBCUTANEOUS at 21:20

## 2020-02-29 RX ADMIN — OXYCODONE AND ACETAMINOPHEN 2 TABLET(S): 5; 325 TABLET ORAL at 00:03

## 2020-02-29 NOTE — PROVIDER CONTACT NOTE (OTHER) - SITUATION
Patient has not voided. Bladder scan done at this time which showed 146cc. Patient OOB to bathroom to try to void, voided ~25cc.

## 2020-02-29 NOTE — PROGRESS NOTE ADULT - ATTENDING COMMENTS
Difficulty urinating.  Straight catheterized today for over 350 cc of urine. Appears concentrated.  Overall decreased urine output. Some elevation of creatinine.  Will increase Fluids intake (restart IVF, 500 cc bolus, encourage PO intake.  If unable to urinate later tonight, will place Auguste.

## 2020-02-29 NOTE — PROGRESS NOTE ADULT - SUBJECTIVE AND OBJECTIVE BOX
INTERVAL HPI/OVERNIGHT EVENTS: carlitos CLD so adv to low fat in AM, pain controlled, UOP low so gave 250cc bolus     STATUS POST:  laparoscopic cholecystectomy    POST OPERATIVE DAY #: 1    SUBJECTIVE:  pt seen at bedside with family member, complaining that she is unable to pee; not ambulating; complaining of right shoulder pain; tolerating liquid diet    MEDICATIONS  (STANDING):  ATENolol  Tablet 50 milliGRAM(s) Oral daily  atorvastatin 20 milliGRAM(s) Oral at bedtime  budesonide 160 MICROgram(s)/formoterol 4.5 MICROgram(s) Inhaler 2 Puff(s) Inhalation two times a day  dextrose 5%. 1000 milliLiter(s) (50 mL/Hr) IV Continuous <Continuous>  dextrose 50% Injectable 12.5 Gram(s) IV Push once  dextrose 50% Injectable 25 Gram(s) IV Push once  dextrose 50% Injectable 25 Gram(s) IV Push once  heparin  Injectable 5000 Unit(s) SubCutaneous every 8 hours  insulin lispro (HumaLOG) corrective regimen sliding scale   SubCutaneous Before meals and at bedtime  losartan 100 milliGRAM(s) Oral daily  pantoprazole    Tablet 40 milliGRAM(s) Oral before breakfast  senna 2 Tablet(s) Oral at bedtime  traZODone 50 milliGRAM(s) Oral daily    MEDICATIONS  (PRN):  acetaminophen   Tablet .. 650 milliGRAM(s) Oral every 6 hours PRN Mild Pain (1 - 3)  dextrose 40% Gel 15 Gram(s) Oral once PRN Blood Glucose LESS THAN 70 milliGRAM(s)/deciliter  glucagon  Injectable 1 milliGRAM(s) IntraMuscular once PRN Glucose LESS THAN 70 milligrams/deciliter  HYDROmorphone  Injectable 0.5 milliGRAM(s) IV Push every 4 hours PRN breakthrough pain  ondansetron Injectable 4 milliGRAM(s) IV Push every 6 hours PRN Nausea and/or Vomiting  oxycodone    5 mG/acetaminophen 325 mG 1 Tablet(s) Oral every 4 hours PRN Moderate Pain (4 - 6)  oxycodone    5 mG/acetaminophen 325 mG 2 Tablet(s) Oral every 4 hours PRN Severe Pain (7 - 10)      Vital Signs Last 24 Hrs  T(C): 36.7 (29 Feb 2020 05:00), Max: 36.9 (28 Feb 2020 22:15)  T(F): 98.1 (29 Feb 2020 05:00), Max: 98.4 (28 Feb 2020 22:15)  HR: 51 (29 Feb 2020 03:37) (46 - 58)  BP: 112/52 (29 Feb 2020 03:37) (112/52 - 189/76)  BP(mean): 75 (29 Feb 2020 03:37) (75 - 111)  RR: 16 (29 Feb 2020 03:37) (12 - 21)  SpO2: 94% (29 Feb 2020 03:37) (91% - 100%)    PHYSICAL EXAM:      Constitutional: A&Ox3    Respiratory: non labored breathing, no respiratory distress    Cardiovascular: NSR, RRR    Gastrointestinal: soft, nondistended, incisions c/d/i, mild incisional tenderness    Extremities: (-) edema                  I&O's Detail    28 Feb 2020 07:01  -  29 Feb 2020 07:00  --------------------------------------------------------  IN:    lactated ringers.: 60 mL  Total IN: 60 mL    OUT:    Voided: 175 mL  Total OUT: 175 mL    Total NET: -115 mL          LABS:                        10.2   5.13  )-----------( 237      ( 28 Feb 2020 06:28 )             31.1     02-28    138  |  102  |  7   ----------------------------<  77  3.9   |  24  |  0.86    Ca    8.7      28 Feb 2020 06:28  Phos  3.3     02-28  Mg     2.1     02-28    TPro  5.9<L>  /  Alb  3.0<L>  /  TBili  0.2  /  DBili  x   /  AST  14  /  ALT  9<L>  /  AlkPhos  39<L>  02-28    PT/INR - ( 28 Feb 2020 06:28 )   PT: 11.9 sec;   INR: 1.04          PTT - ( 28 Feb 2020 06:28 )  PTT:29.3 sec      RADIOLOGY & ADDITIONAL STUDIES:

## 2020-02-29 NOTE — PROVIDER CONTACT NOTE (OTHER) - ACTION/TREATMENT ORDERED:
IVELISSE Osorio made aware. No further orders at this time.
Will order D50.
BOLUS 250CC LR GIVEN
Continue to monitor BP and HR

## 2020-02-29 NOTE — PROVIDER CONTACT NOTE (OTHER) - ASSESSMENT
Bladder nondistended. Does not feel urge to urinate at this time. Patient currently ambulating in hallway.
Bladder scan done @ 3;30- 200cc   vitals: /52 HR 51 R 16 SPO2 94% PAIN 0
vitals:  /56  HR 52  R 18  SPO2 95  PAIN 0

## 2020-02-29 NOTE — PROGRESS NOTE ADULT - ASSESSMENT
88F PMH DM, HTN, dementia, TIAX2 (5yrs ago), arthritis, unknown colitis PSH of b/l knee arthroplasty p/w 3 wks of epigastric pain, nausea and emesis s/p EGD which found mild gastritis in antrum. bx sent, now s/p lap george, uncomplicated.    Tylenol for pain control  low fat diet  HSQ/SCDs for DVT ppx  OOBA/IS  AM labs  encourage ambulation

## 2020-03-01 ENCOUNTER — TRANSCRIPTION ENCOUNTER (OUTPATIENT)
Age: 85
End: 2020-03-01

## 2020-03-01 VITALS — TEMPERATURE: 98 F

## 2020-03-01 LAB
ALBUMIN SERPL ELPH-MCNC: 3.1 G/DL — LOW (ref 3.3–5)
ALP SERPL-CCNC: 39 U/L — LOW (ref 40–120)
ALT FLD-CCNC: 17 U/L — SIGNIFICANT CHANGE UP (ref 10–45)
ANION GAP SERPL CALC-SCNC: 13 MMOL/L — SIGNIFICANT CHANGE UP (ref 5–17)
AST SERPL-CCNC: 25 U/L — SIGNIFICANT CHANGE UP (ref 10–40)
BILIRUB SERPL-MCNC: 0.2 MG/DL — SIGNIFICANT CHANGE UP (ref 0.2–1.2)
BUN SERPL-MCNC: 18 MG/DL — SIGNIFICANT CHANGE UP (ref 7–23)
CALCIUM SERPL-MCNC: 8.9 MG/DL — SIGNIFICANT CHANGE UP (ref 8.4–10.5)
CHLORIDE SERPL-SCNC: 103 MMOL/L — SIGNIFICANT CHANGE UP (ref 96–108)
CO2 SERPL-SCNC: 26 MMOL/L — SIGNIFICANT CHANGE UP (ref 22–31)
CREAT SERPL-MCNC: 1.19 MG/DL — SIGNIFICANT CHANGE UP (ref 0.5–1.3)
GLUCOSE BLDC GLUCOMTR-MCNC: 159 MG/DL — HIGH (ref 70–99)
GLUCOSE BLDC GLUCOMTR-MCNC: 93 MG/DL — SIGNIFICANT CHANGE UP (ref 70–99)
GLUCOSE SERPL-MCNC: 94 MG/DL — SIGNIFICANT CHANGE UP (ref 70–99)
HCT VFR BLD CALC: 31.8 % — LOW (ref 34.5–45)
HGB BLD-MCNC: 9.8 G/DL — LOW (ref 11.5–15.5)
MAGNESIUM SERPL-MCNC: 2.4 MG/DL — SIGNIFICANT CHANGE UP (ref 1.6–2.6)
MCHC RBC-ENTMCNC: 30.8 GM/DL — LOW (ref 32–36)
MCHC RBC-ENTMCNC: 31.1 PG — SIGNIFICANT CHANGE UP (ref 27–34)
MCV RBC AUTO: 101 FL — HIGH (ref 80–100)
NRBC # BLD: 0 /100 WBCS — SIGNIFICANT CHANGE UP (ref 0–0)
PHOSPHATE SERPL-MCNC: 3.3 MG/DL — SIGNIFICANT CHANGE UP (ref 2.5–4.5)
PLATELET # BLD AUTO: 212 K/UL — SIGNIFICANT CHANGE UP (ref 150–400)
POTASSIUM SERPL-MCNC: 4.3 MMOL/L — SIGNIFICANT CHANGE UP (ref 3.5–5.3)
POTASSIUM SERPL-SCNC: 4.3 MMOL/L — SIGNIFICANT CHANGE UP (ref 3.5–5.3)
PROT SERPL-MCNC: 5.7 G/DL — LOW (ref 6–8.3)
RBC # BLD: 3.15 M/UL — LOW (ref 3.8–5.2)
RBC # FLD: 13.1 % — SIGNIFICANT CHANGE UP (ref 10.3–14.5)
SODIUM SERPL-SCNC: 142 MMOL/L — SIGNIFICANT CHANGE UP (ref 135–145)
WBC # BLD: 7.57 K/UL — SIGNIFICANT CHANGE UP (ref 3.8–10.5)
WBC # FLD AUTO: 7.57 K/UL — SIGNIFICANT CHANGE UP (ref 3.8–10.5)

## 2020-03-01 PROCEDURE — 99238 HOSP IP/OBS DSCHRG MGMT 30/<: CPT

## 2020-03-01 RX ORDER — DOCUSATE SODIUM 100 MG
1 CAPSULE ORAL
Qty: 60 | Refills: 0
Start: 2020-03-01 | End: 2020-03-30

## 2020-03-01 RX ORDER — TRAZODONE HCL 50 MG
1 TABLET ORAL
Qty: 0 | Refills: 0 | DISCHARGE

## 2020-03-01 RX ORDER — LIPASE/PROTEASE/AMYLASE 16-48-48K
1 CAPSULE,DELAYED RELEASE (ENTERIC COATED) ORAL
Qty: 0 | Refills: 0 | DISCHARGE

## 2020-03-01 RX ADMIN — OXYCODONE AND ACETAMINOPHEN 1 TABLET(S): 5; 325 TABLET ORAL at 01:13

## 2020-03-01 RX ADMIN — LOSARTAN POTASSIUM 100 MILLIGRAM(S): 100 TABLET, FILM COATED ORAL at 06:43

## 2020-03-01 RX ADMIN — OXYCODONE AND ACETAMINOPHEN 1 TABLET(S): 5; 325 TABLET ORAL at 01:30

## 2020-03-01 RX ADMIN — ATENOLOL 50 MILLIGRAM(S): 25 TABLET ORAL at 06:42

## 2020-03-01 RX ADMIN — PANTOPRAZOLE SODIUM 40 MILLIGRAM(S): 20 TABLET, DELAYED RELEASE ORAL at 06:43

## 2020-03-01 RX ADMIN — BUDESONIDE AND FORMOTEROL FUMARATE DIHYDRATE 2 PUFF(S): 160; 4.5 AEROSOL RESPIRATORY (INHALATION) at 09:11

## 2020-03-01 RX ADMIN — HEPARIN SODIUM 5000 UNIT(S): 5000 INJECTION INTRAVENOUS; SUBCUTANEOUS at 06:42

## 2020-03-01 NOTE — PROGRESS NOTE ADULT - SUBJECTIVE AND OBJECTIVE BOX
STATUS POST:  POD # 2 s/p Lap Ethel    INTERVAL HPI/OVERNIGHT EVENTS: straight cathed for urinary retention, voided spontaneously     SUBJECTIVE: Examined with chief resident at the bedside, resting comfortably. This morning, she feels well; her pain is well-controlled. No nausea or vomiting. No acute complaints.     ATENolol  Tablet 50 milliGRAM(s) Oral daily  heparin  Injectable 5000 Unit(s) SubCutaneous every 8 hours  losartan 100 milliGRAM(s) Oral daily      Vital Signs Last 24 Hrs  T(C): 36.6 (01 Mar 2020 09:40), Max: 36.9 (01 Mar 2020 04:17)  T(F): 97.9 (01 Mar 2020 09:40), Max: 98.5 (01 Mar 2020 04:17)  HR: 54 (01 Mar 2020 08:14) (52 - 60)  BP: 156/65 (01 Mar 2020 08:14) (105/52 - 156/65)  BP(mean): 94 (01 Mar 2020 08:14) (75 - 116)  RR: 18 (01 Mar 2020 08:14) (13 - 18)  SpO2: 98% (01 Mar 2020 08:14) (92% - 98%)  I&O's Detail    29 Feb 2020 07:01  -  01 Mar 2020 07:00  --------------------------------------------------------  IN:    Lactated Ringers IV Bolus: 500 mL    lactated ringers.: 800 mL  Total IN: 1300 mL    OUT:    Intermittent Catheterization - Urethral: 300 mL    Voided: 850 mL  Total OUT: 1150 mL    Total NET: 150 mL      01 Mar 2020 07:01  -  01 Mar 2020 11:07  --------------------------------------------------------  IN:    Oral Fluid: 360 mL  Total IN: 360 mL    OUT:  Total OUT: 0 mL    Total NET: 360 mL          Physical Exam:  General: NAD, resting comfortably in bed  Pulmonary: Nonlabored breathing, no respiratory distress  Cardiovascular: NSR  Abdominal: soft, non-tender, non-distended, incision c/d/i  Extremities: WWP, normal strength          LABS:                        9.8    7.57  )-----------( 212      ( 01 Mar 2020 06:39 )             31.8     03-01    142  |  103  |  18  ----------------------------<  94  4.3   |  26  |  1.19    Ca    8.9      01 Mar 2020 06:39  Phos  3.3     03-01  Mg     2.4     03-01    TPro  5.7<L>  /  Alb  3.1<L>  /  TBili  0.2  /  DBili  x   /  AST  25  /  ALT  17  /  AlkPhos  39<L>  03-01          RADIOLOGY & ADDITIONAL STUDIES:

## 2020-03-01 NOTE — DISCHARGE NOTE NURSING/CASE MANAGEMENT/SOCIAL WORK - PATIENT PORTAL LINK FT
You can access the FollowMyHealth Patient Portal offered by [facility] by registering at the following website: [email or web address]. By joining [facility]’s FollowMyHealth portal, you will also be able to view your health information using other applications (apps) compatible with our system.

## 2020-03-01 NOTE — PROGRESS NOTE ADULT - ASSESSMENT
A/P: 88F PMH DM, HTN, dementia, TIAX2 (5yrs ago), arthritis, unknown colitis PSH of b/l knee arthroplasty p/w 3 wks of epigastric pain, nausea and emesis s/p EGD which found mild gastritis in antrum. bx sent, now s/p lap george, uncomplicated. LFTs normal. Suitable for discharge.    Tylenol for pain control  low fat diet  HSQ/SCDs for DVT ppx  OOBA/IS  Discharge

## 2020-03-02 LAB — SURGICAL PATHOLOGY STUDY: SIGNIFICANT CHANGE UP

## 2020-03-06 DIAGNOSIS — R10.9 UNSPECIFIED ABDOMINAL PAIN: ICD-10-CM

## 2020-03-06 DIAGNOSIS — J45.909 UNSPECIFIED ASTHMA, UNCOMPLICATED: ICD-10-CM

## 2020-03-06 DIAGNOSIS — F03.90 UNSPECIFIED DEMENTIA WITHOUT BEHAVIORAL DISTURBANCE: ICD-10-CM

## 2020-03-06 DIAGNOSIS — K29.70 GASTRITIS, UNSPECIFIED, WITHOUT BLEEDING: ICD-10-CM

## 2020-03-06 DIAGNOSIS — E11.9 TYPE 2 DIABETES MELLITUS WITHOUT COMPLICATIONS: ICD-10-CM

## 2020-03-06 DIAGNOSIS — E78.5 HYPERLIPIDEMIA, UNSPECIFIED: ICD-10-CM

## 2020-03-06 DIAGNOSIS — K80.10 CALCULUS OF GALLBLADDER WITH CHRONIC CHOLECYSTITIS WITHOUT OBSTRUCTION: ICD-10-CM

## 2020-03-06 DIAGNOSIS — I10 ESSENTIAL (PRIMARY) HYPERTENSION: ICD-10-CM

## 2020-03-06 DIAGNOSIS — Z79.4 LONG TERM (CURRENT) USE OF INSULIN: ICD-10-CM

## 2020-03-06 DIAGNOSIS — Z86.73 PERSONAL HISTORY OF TRANSIENT ISCHEMIC ATTACK (TIA), AND CEREBRAL INFARCTION WITHOUT RESIDUAL DEFICITS: ICD-10-CM

## 2020-03-06 DIAGNOSIS — Z96.653 PRESENCE OF ARTIFICIAL KNEE JOINT, BILATERAL: ICD-10-CM

## 2020-03-06 PROBLEM — I35.9 NONRHEUMATIC AORTIC VALVE DISORDER, UNSPECIFIED: Chronic | Status: ACTIVE | Noted: 2020-02-28

## 2020-03-06 PROBLEM — C80.1 MALIGNANT (PRIMARY) NEOPLASM, UNSPECIFIED: Chronic | Status: ACTIVE | Noted: 2020-02-28

## 2020-03-06 PROCEDURE — 82962 GLUCOSE BLOOD TEST: CPT

## 2020-03-06 PROCEDURE — 97162 PT EVAL MOD COMPLEX 30 MIN: CPT

## 2020-03-06 PROCEDURE — 83690 ASSAY OF LIPASE: CPT

## 2020-03-06 PROCEDURE — 84100 ASSAY OF PHOSPHORUS: CPT

## 2020-03-06 PROCEDURE — 97164 PT RE-EVAL EST PLAN CARE: CPT

## 2020-03-06 PROCEDURE — 81001 URINALYSIS AUTO W/SCOPE: CPT

## 2020-03-06 PROCEDURE — 74177 CT ABD & PELVIS W/CONTRAST: CPT

## 2020-03-06 PROCEDURE — 88304 TISSUE EXAM BY PATHOLOGIST: CPT

## 2020-03-06 PROCEDURE — 85730 THROMBOPLASTIN TIME PARTIAL: CPT

## 2020-03-06 PROCEDURE — 99285 EMERGENCY DEPT VISIT HI MDM: CPT

## 2020-03-06 PROCEDURE — 80053 COMPREHEN METABOLIC PANEL: CPT

## 2020-03-06 PROCEDURE — 76705 ECHO EXAM OF ABDOMEN: CPT

## 2020-03-06 PROCEDURE — 86850 RBC ANTIBODY SCREEN: CPT

## 2020-03-06 PROCEDURE — 94640 AIRWAY INHALATION TREATMENT: CPT

## 2020-03-06 PROCEDURE — 85027 COMPLETE CBC AUTOMATED: CPT

## 2020-03-06 PROCEDURE — 36415 COLL VENOUS BLD VENIPUNCTURE: CPT

## 2020-03-06 PROCEDURE — 88305 TISSUE EXAM BY PATHOLOGIST: CPT

## 2020-03-06 PROCEDURE — 93005 ELECTROCARDIOGRAM TRACING: CPT

## 2020-03-06 PROCEDURE — 83036 HEMOGLOBIN GLYCOSYLATED A1C: CPT

## 2020-03-06 PROCEDURE — 85610 PROTHROMBIN TIME: CPT

## 2020-03-06 PROCEDURE — 82248 BILIRUBIN DIRECT: CPT

## 2020-03-06 PROCEDURE — 71045 X-RAY EXAM CHEST 1 VIEW: CPT

## 2020-03-06 PROCEDURE — 85025 COMPLETE CBC W/AUTO DIFF WBC: CPT

## 2020-03-06 PROCEDURE — 83735 ASSAY OF MAGNESIUM: CPT

## 2020-03-06 PROCEDURE — 86901 BLOOD TYPING SEROLOGIC RH(D): CPT

## 2020-03-09 LAB — SURGICAL PATHOLOGY STUDY: SIGNIFICANT CHANGE UP

## 2020-03-11 ENCOUNTER — APPOINTMENT (OUTPATIENT)
Dept: HEART AND VASCULAR | Facility: CLINIC | Age: 85
End: 2020-03-11
Payer: MEDICARE

## 2020-03-11 ENCOUNTER — NON-APPOINTMENT (OUTPATIENT)
Age: 85
End: 2020-03-11

## 2020-03-11 VITALS
DIASTOLIC BLOOD PRESSURE: 68 MMHG | HEIGHT: 64 IN | BODY MASS INDEX: 29.19 KG/M2 | WEIGHT: 171 LBS | SYSTOLIC BLOOD PRESSURE: 120 MMHG | HEART RATE: 53 BPM

## 2020-03-11 PROCEDURE — 99214 OFFICE O/P EST MOD 30 MIN: CPT | Mod: 25

## 2020-03-11 PROCEDURE — 93000 ELECTROCARDIOGRAM COMPLETE: CPT

## 2020-03-11 RX ORDER — OLMESARTAN MEDOXOMIL 40 MG/1
40 TABLET, FILM COATED ORAL
Qty: 90 | Refills: 3 | Status: ACTIVE | COMMUNITY
Start: 1900-01-01 | End: 1900-01-01

## 2020-03-11 NOTE — DISCUSSION/SUMMARY
[FreeTextEntry1] : \par EKG: Sinus  Bradycardia 53/min\par ECHO: LV EF 60%, impaired relaxation of LV, mild AR, mild TR, calcified AV, MAC, no AS.\par Holter/Event: rare VPC, frequent SVPC, no runs\par \par 1. BROWN - likely impaired relaxation - cont bisoprolol 5mg po daily and benicar 40mg po daily\par add amlodipine 5mg po daily\par 2. Palpitations -SVPC - cont bisoprolol\par 3. DM - cont as per PMD\par 4. HLD - cont current meds.\par 5. HTN - add amlodipine 5mg po daily\par 6. RTC 3 months\par

## 2020-03-11 NOTE — PHYSICAL EXAM
[General Appearance - Well Developed] : well developed [General Appearance - Well Nourished] : well nourished [Normal Oropharynx] : normal oropharynx [Normal Jugular Venous V Waves Present] : normal jugular venous V waves present [Heart Rate And Rhythm] : heart rate and rhythm were normal [Heart Sounds] : normal S1 and S2 [Arterial Pulses Normal] : the arterial pulses were normal [Bowel Sounds] : normal bowel sounds [Abdomen Soft] : soft [Abdomen Tenderness] : non-tender [] : no hepato-splenomegaly [Abnormal Walk] : normal gait [Nail Clubbing] : no clubbing of the fingernails [Skin Color & Pigmentation] : normal skin color and pigmentation [Oriented To Time, Place, And Person] : oriented to person, place, and time [FreeTextEntry1] : RAVINDRA 2/6 at right second IC space

## 2020-03-11 NOTE — REVIEW OF SYSTEMS
[Dyspnea on exertion] : dyspnea during exertion [Palpitations] : palpitations [Fever] : no fever [Mouth Sores] : no mouth sores [Chest Pain] : no chest pain [Cough] : no cough [Abdominal Pain] : no abdominal pain [Heartburn] : no heartburn [Dysuria] : no dysuria [Joint Pain] : no joint pain [Skin: A Rash] : no rash: [Dizziness] : no dizziness [Confusion] : no confusion was observed [Excessive Thirst] : no polydipsia [Easy Bleeding] : no tendency for easy bleeding

## 2020-03-11 NOTE — HISTORY OF PRESENT ILLNESS
[FreeTextEntry1] : Patient comes for f/u. Recently BP elevated 160-180/80. Palpitations and BROWN. Compliant on meds. No LOC, no dizziness. \par \par

## 2020-05-13 ENCOUNTER — APPOINTMENT (OUTPATIENT)
Dept: HEART AND VASCULAR | Facility: CLINIC | Age: 85
End: 2020-05-13
Payer: MEDICARE

## 2020-05-13 VITALS
DIASTOLIC BLOOD PRESSURE: 76 MMHG | HEIGHT: 64 IN | SYSTOLIC BLOOD PRESSURE: 124 MMHG | TEMPERATURE: 97.4 F | HEART RATE: 71 BPM | BODY MASS INDEX: 40.97 KG/M2 | WEIGHT: 240 LBS

## 2020-05-13 PROCEDURE — 99214 OFFICE O/P EST MOD 30 MIN: CPT | Mod: 95

## 2020-05-13 NOTE — HISTORY OF PRESENT ILLNESS
[FreeTextEntry1] : This visit was conducted with the use of video visit for 25 minutes that permits real time communication between patient and provider. Verbal consent for tele health obtained on 05/13/2020. Patient was located off-site and provider was on-site.\par \par \par \par Patient comes for f/u.  Palpitations and BROWN. Compliant on meds. No LOC, no dizziness. BP was well controlled until recently when BP became elevated again 160-180/80.\par \par

## 2020-05-13 NOTE — REVIEW OF SYSTEMS
[Dyspnea on exertion] : dyspnea during exertion [Palpitations] : palpitations [Fever] : no fever [Mouth Sores] : no mouth sores [Cough] : no cough [Chest Pain] : no chest pain [Abdominal Pain] : no abdominal pain [Heartburn] : no heartburn [Dysuria] : no dysuria [Joint Pain] : no joint pain [Skin: A Rash] : no rash: [Dizziness] : no dizziness [Confusion] : no confusion was observed [Excessive Thirst] : no polydipsia [Easy Bleeding] : no tendency for easy bleeding

## 2020-05-13 NOTE — DISCUSSION/SUMMARY
[FreeTextEntry1] : EKG: Sinus Bradycardia 53/min\par ECHO: LV EF 60%, impaired relaxation of LV, mild AR, mild TR, calcified AV, MAC, no AS.\par Holter/Event: rare VPC, frequent SVPC, no runs\par \par 1. BROWN - likely impaired relaxation - cont bisoprolol 5mg po daily and benicar 40mg po daily\par add amlodipine 5mg po daily\par 2. Palpitations -SVPC - cont bisoprolol\par 3. DM - cont as per PMD\par 4. HLD - cont current meds.\par 5. HTN - increase amlodipine 10mg po daily\par 6. Tele visit in 1 week\par

## 2020-05-20 ENCOUNTER — APPOINTMENT (OUTPATIENT)
Dept: HEART AND VASCULAR | Facility: CLINIC | Age: 85
End: 2020-05-20
Payer: MEDICARE

## 2020-05-20 PROCEDURE — 99214 OFFICE O/P EST MOD 30 MIN: CPT

## 2020-05-20 NOTE — REVIEW OF SYSTEMS
[Dyspnea on exertion] : dyspnea during exertion [Palpitations] : palpitations [Fever] : no fever [Chest Pain] : no chest pain [Mouth Sores] : no mouth sores [Cough] : no cough [Abdominal Pain] : no abdominal pain [Heartburn] : no heartburn [Dysuria] : no dysuria [Joint Pain] : no joint pain [Skin: A Rash] : no rash: [Dizziness] : no dizziness [Confusion] : no confusion was observed [Excessive Thirst] : no polydipsia [Easy Bleeding] : no tendency for easy bleeding

## 2020-05-20 NOTE — DISCUSSION/SUMMARY
[FreeTextEntry1] : EKG: Sinus Bradycardia 53/min\par ECHO: LV EF 60%, impaired relaxation of LV, mild AR, mild TR, calcified AV, MAC, no AS.\par Holter/Event: rare VPC, frequent SVPC, no runs\par \par 1. BRONW - likely impaired relaxation - cont bisoprolol 5mg po daily and benicar 40mg po daily\par cont amlodipine 10mg po daily\par 2. Palpitations -SVPC - cont bisoprolol\par 3. DM - cont as per PMD\par 4. HLD - cont current meds.\par 5. HTN - increase amlodipine 10mg po daily\par 6. Tele visit in 2 weeks\par

## 2020-05-20 NOTE — HISTORY OF PRESENT ILLNESS
[FreeTextEntry1] : This visit was conducted with the use of video visit for 25 minutes that permits real time communication between patient and provider. Verbal consent for tele health obtained on 05/20/2020. Patient was located off-site and provider was on-site.\par \par \par Patient comes for f/u. Palpitations and BROWN. Compliant on meds. No LOC, no dizziness. BP was well controlled during the last week. 130-140/80.\par

## 2020-08-11 ENCOUNTER — RX RENEWAL (OUTPATIENT)
Age: 85
End: 2020-08-11

## 2020-08-11 RX ORDER — AMLODIPINE BESYLATE 10 MG/1
10 TABLET ORAL DAILY
Qty: 90 | Refills: 3 | Status: ACTIVE | COMMUNITY
Start: 2020-03-11 | End: 1900-01-01

## 2020-09-02 ENCOUNTER — RX RENEWAL (OUTPATIENT)
Age: 85
End: 2020-09-02

## 2020-12-16 ENCOUNTER — RX RENEWAL (OUTPATIENT)
Age: 85
End: 2020-12-16

## 2020-12-16 RX ORDER — BISOPROLOL FUMARATE 5 MG/1
5 TABLET, FILM COATED ORAL DAILY
Qty: 90 | Refills: 3 | Status: ACTIVE | COMMUNITY
Start: 2018-08-01 | End: 1900-01-01

## 2021-01-27 ENCOUNTER — LABORATORY RESULT (OUTPATIENT)
Age: 86
End: 2021-01-27

## 2021-01-27 ENCOUNTER — APPOINTMENT (OUTPATIENT)
Dept: HEART AND VASCULAR | Facility: CLINIC | Age: 86
End: 2021-01-27
Payer: MEDICARE

## 2021-01-27 ENCOUNTER — NON-APPOINTMENT (OUTPATIENT)
Age: 86
End: 2021-01-27

## 2021-01-27 VITALS
HEART RATE: 50 BPM | TEMPERATURE: 97.7 F | SYSTOLIC BLOOD PRESSURE: 140 MMHG | DIASTOLIC BLOOD PRESSURE: 64 MMHG | BODY MASS INDEX: 31.58 KG/M2 | WEIGHT: 185 LBS | HEIGHT: 64 IN

## 2021-01-27 PROCEDURE — 99214 OFFICE O/P EST MOD 30 MIN: CPT | Mod: 25

## 2021-01-27 PROCEDURE — 93000 ELECTROCARDIOGRAM COMPLETE: CPT

## 2021-01-27 PROCEDURE — 93306 TTE W/DOPPLER COMPLETE: CPT

## 2021-01-27 NOTE — HISTORY OF PRESENT ILLNESS
[FreeTextEntry1] : Patient comes for f/u. Palpitations and BROWN. Compliant on meds. No LOC, no dizziness. BP was well controlled during the last week. Lately high BP in AM, up to 190/80. Not compliant on HCTZ.\par

## 2021-01-27 NOTE — DISCUSSION/SUMMARY
[FreeTextEntry1] : EKG: Sinus  Bradycardia 50/min\par ECHO: LV EF 60%, impaired relaxation of LV, mild AR, mild AS, mild MR,  mild TR, calcified AV, MAC. Ao 4.0\par Holter/Event: rare VPC, frequent SVPC, no runs\par \par 1. BROWN - likely impaired relaxation - cont bisoprolol 5mg po daily and benicar 40mg po daily\par cont amlodipine 10mg po daily, compliance on HCTZ d/wn pt.- BP diary\par 2. Palpitations -SVPC - cont bisoprolol\par 3. DM - cont as per PMD\par 4. HLD - cont current meds.\par 5. HTN - cont meds as above.\par 6. Tele visit in 2 weeks.\par

## 2021-01-29 LAB
ALBUMIN SERPL ELPH-MCNC: 4.1 G/DL
ALP BLD-CCNC: 77 U/L
ALT SERPL-CCNC: 10 U/L
ANION GAP SERPL CALC-SCNC: 16 MMOL/L
AST SERPL-CCNC: 14 U/L
BASOPHILS # BLD AUTO: 0.07 K/UL
BASOPHILS NFR BLD AUTO: 0.8 %
BILIRUB SERPL-MCNC: <0.2 MG/DL
BUN SERPL-MCNC: 20 MG/DL
CALCIUM SERPL-MCNC: 9.7 MG/DL
CHLORIDE SERPL-SCNC: 103 MMOL/L
CHOLEST SERPL-MCNC: 357 MG/DL
CO2 SERPL-SCNC: 20 MMOL/L
CREAT SERPL-MCNC: 1.04 MG/DL
EOSINOPHIL # BLD AUTO: 0.21 K/UL
EOSINOPHIL NFR BLD AUTO: 2.5 %
ESTIMATED AVERAGE GLUCOSE: 131 MG/DL
GLUCOSE SERPL-MCNC: 92 MG/DL
HBA1C MFR BLD HPLC: 6.2 %
HCT VFR BLD CALC: 39.8 %
HDLC SERPL-MCNC: 54 MG/DL
HGB BLD-MCNC: 12.7 G/DL
IMM GRANULOCYTES NFR BLD AUTO: 0.4 %
LDLC SERPL CALC-MCNC: 244 MG/DL
LYMPHOCYTES # BLD AUTO: 2.36 K/UL
LYMPHOCYTES NFR BLD AUTO: 27.9 %
MAN DIFF?: NORMAL
MCHC RBC-ENTMCNC: 31.2 PG
MCHC RBC-ENTMCNC: 31.9 GM/DL
MCV RBC AUTO: 97.8 FL
MONOCYTES # BLD AUTO: 0.77 K/UL
MONOCYTES NFR BLD AUTO: 9.1 %
NEUTROPHILS # BLD AUTO: 5.03 K/UL
NEUTROPHILS NFR BLD AUTO: 59.3 %
NONHDLC SERPL-MCNC: 302 MG/DL
PLATELET # BLD AUTO: 307 K/UL
POTASSIUM SERPL-SCNC: 4.4 MMOL/L
PROT SERPL-MCNC: 6.7 G/DL
RBC # BLD: 4.07 M/UL
RBC # FLD: 12.8 %
SODIUM SERPL-SCNC: 139 MMOL/L
TRIGL SERPL-MCNC: 291 MG/DL
WBC # FLD AUTO: 8.47 K/UL

## 2021-01-29 RX ORDER — MUPIROCIN 20 MG/G
2 OINTMENT TOPICAL
Qty: 22 | Refills: 0 | Status: COMPLETED | COMMUNITY
Start: 2021-01-12

## 2021-01-29 RX ORDER — TIOTROPIUM BROMIDE INHALATION SPRAY 3.12 UG/1
2.5 SPRAY, METERED RESPIRATORY (INHALATION)
Qty: 4 | Refills: 0 | Status: COMPLETED | COMMUNITY
Start: 2020-11-04

## 2021-01-29 RX ORDER — DICLOFENAC SODIUM 1% 10 MG/G
1 GEL TOPICAL
Qty: 200 | Refills: 0 | Status: COMPLETED | COMMUNITY
Start: 2020-12-15

## 2021-01-29 RX ORDER — LINACLOTIDE 290 UG/1
290 CAPSULE, GELATIN COATED ORAL
Qty: 30 | Refills: 0 | Status: COMPLETED | COMMUNITY
Start: 2020-12-28

## 2021-01-29 RX ORDER — TRAZODONE HYDROCHLORIDE 50 MG/1
50 TABLET ORAL
Qty: 30 | Refills: 0 | Status: COMPLETED | COMMUNITY
Start: 2020-08-13

## 2021-01-29 RX ORDER — LANSOPRAZOLE 30 MG/1
30 CAPSULE, DELAYED RELEASE ORAL
Qty: 30 | Refills: 0 | Status: COMPLETED | COMMUNITY
Start: 2021-01-25

## 2021-01-29 RX ORDER — TIOTROPIUM BROMIDE INHALATION SPRAY 1.56 UG/1
1.25 SPRAY, METERED RESPIRATORY (INHALATION)
Qty: 4 | Refills: 0 | Status: COMPLETED | COMMUNITY
Start: 2020-08-03

## 2021-01-29 RX ORDER — BETAMETHASONE DIPROPIONATE 0.5 MG/G
0.05 OINTMENT, AUGMENTED TOPICAL
Qty: 50 | Refills: 0 | Status: COMPLETED | COMMUNITY
Start: 2021-01-25

## 2021-01-29 RX ORDER — METOCLOPRAMIDE 5 MG/1
5 TABLET ORAL
Qty: 60 | Refills: 0 | Status: COMPLETED | COMMUNITY
Start: 2020-12-28

## 2021-01-29 RX ORDER — LINAGLIPTIN 5 MG/1
5 TABLET, FILM COATED ORAL
Qty: 30 | Refills: 0 | Status: COMPLETED | COMMUNITY
Start: 2020-12-31

## 2021-01-29 RX ORDER — TRIAMCINOLONE ACETONIDE 1 MG/G
0.1 CREAM TOPICAL
Qty: 80 | Refills: 0 | Status: COMPLETED | COMMUNITY
Start: 2021-01-12

## 2021-01-29 RX ORDER — HYDROCORTISONE 1 %
12 CREAM (GRAM) TOPICAL
Qty: 396 | Refills: 0 | Status: COMPLETED | COMMUNITY
Start: 2020-12-02

## 2021-01-29 RX ORDER — FLUTICASONE PROPIONATE AND SALMETEROL XINAFOATE 115; 21 UG/1; UG/1
115-21 AEROSOL, METERED RESPIRATORY (INHALATION)
Qty: 12 | Refills: 0 | Status: COMPLETED | COMMUNITY
Start: 2020-12-31

## 2021-01-29 RX ORDER — MODAFINIL 100 MG/1
100 TABLET ORAL
Qty: 30 | Refills: 0 | Status: COMPLETED | COMMUNITY
Start: 2020-11-19

## 2021-01-29 RX ORDER — PANCRELIPASE 36000; 180000; 114000 [USP'U]/1; [USP'U]/1; [USP'U]/1
36000-114000 CAPSULE, DELAYED RELEASE PELLETS ORAL
Qty: 90 | Refills: 0 | Status: COMPLETED | COMMUNITY
Start: 2020-12-31

## 2021-01-29 RX ORDER — ROSUVASTATIN CALCIUM 40 MG/1
40 TABLET, FILM COATED ORAL
Qty: 30 | Refills: 0 | Status: COMPLETED | COMMUNITY
Start: 2020-12-28

## 2021-01-29 RX ORDER — ROSUVASTATIN CALCIUM 40 MG/1
40 TABLET, FILM COATED ORAL
Qty: 90 | Refills: 3 | Status: ACTIVE | COMMUNITY
Start: 1900-01-01 | End: 1900-01-01

## 2021-01-29 RX ORDER — ONDANSETRON 4 MG/1
4 TABLET, ORALLY DISINTEGRATING ORAL
Qty: 30 | Refills: 0 | Status: COMPLETED | COMMUNITY
Start: 2020-12-28

## 2021-02-17 ENCOUNTER — APPOINTMENT (OUTPATIENT)
Dept: HEART AND VASCULAR | Facility: CLINIC | Age: 86
End: 2021-02-17
Payer: MEDICARE

## 2021-02-17 DIAGNOSIS — Z00.00 ENCOUNTER FOR GENERAL ADULT MEDICAL EXAMINATION W/OUT ABNORMAL FINDINGS: ICD-10-CM

## 2021-02-17 PROCEDURE — 99214 OFFICE O/P EST MOD 30 MIN: CPT | Mod: 95

## 2021-02-17 NOTE — HISTORY OF PRESENT ILLNESS
[FreeTextEntry1] : This visit was conducted with the use of video visit for 26 minutes that permits real time communication between patient and provider. Verbal consent for tele health obtained on 02/17/2020. Patient was located off-site and provider was off-site.\par \par Patient comes for f/u. Palpitations better and BROWN. Compliant on meds. No LOC, no dizziness. BP was well controlled during the last week.  More compliant on HCTZ. 120//65.\par \par

## 2021-02-17 NOTE — DISCUSSION/SUMMARY
[FreeTextEntry1] : EKG: Sinus Bradycardia 50/min\par ECHO: LV EF 60%, impaired relaxation of LV, mild AR, mild AS, mild MR, mild TR, calcified AV, MAC. Ao 4.0\par Holter/Event: rare VPC, frequent SVPC, no runs\par \par 1. BROWN - likely impaired relaxation - cont bisoprolol 5mg po daily and benicar 40mg po daily\par cont amlodipine 10mg po daily, compliance on HCTZ d/wn pt.- BP diary to continue\par check CMP\par 2. Palpitations -SVPC - cont bisoprolol\par 3. DM - cont as per PMD\par 4. HLD - cont current meds.\par 5. HTN - cont meds as above.\par 6. Tele visit in 4 weeks.\par

## 2021-02-17 NOTE — REVIEW OF SYSTEMS
[Palpitations] : palpitations [Fever] : no fever [Mouth Sores] : no mouth sores [Dyspnea on exertion] : not dyspnea during exertion [Chest Pain] : no chest pain [Cough] : no cough [Abdominal Pain] : no abdominal pain [Heartburn] : no heartburn [Dysuria] : no dysuria [Joint Pain] : no joint pain [Skin: A Rash] : no rash: [Dizziness] : no dizziness [Confusion] : no confusion was observed [Excessive Thirst] : no polydipsia [Easy Bleeding] : no tendency for easy bleeding

## 2021-03-24 ENCOUNTER — APPOINTMENT (OUTPATIENT)
Dept: HEART AND VASCULAR | Facility: CLINIC | Age: 86
End: 2021-03-24

## 2021-07-15 ENCOUNTER — RX RENEWAL (OUTPATIENT)
Age: 86
End: 2021-07-15

## 2021-08-05 NOTE — BRIEF OPERATIVE NOTE - OPERATION/FINDINGS
Pt placed in reverse Trendelenburg w/ right side up. Omental attachments to GB were gently swept away. GB fundus retracted superiorly over dome of liver. Filmy adhesions between the GB & omentum/duo cauterized. GB infundibulum retracted laterally towards RLQ exposing Calot’s triangle. Critical view of safety obtained. Cystic duct and artery clipped and divided. Peritoneal attachments btw GB & liver bed  w/ electrocautery. Hemostasis achieved. No leakage of bile from cystic duct stump. Purse String (Simple) Text: Given the location of the defect and the characteristics of the surrounding skin a purse string closure was deemed most appropriate.  Undermining was performed circumfirentially around the surgical defect.  A purse string suture was then placed and tightened.

## 2021-10-28 NOTE — DIETITIAN INITIAL EVALUATION ADULT. - REASON INDICATOR FOR ASSESSMENT
Thank you for coming. Please take you medications as prescribed. Please continue to eat a balanced diet and exercise. Labs were obtained and we will contact you with the results. If you have questions please let me know via phone or email.
LOS Assessment

## 2021-12-01 NOTE — PROGRESS NOTE ADULT - SUBJECTIVE AND OBJECTIVE BOX
Pt informed order was placed ready to schedule appt   POST-OPERATIVE NOTE    Procedure: laparoscopic cholecystectomy    Diagnosis/Indication: biliary colic	    Surgeon: Dr. Connors    S: Pt has no complaints. Denies CP, SOB, BROWN, calf tenderness. Pain controlled with medication. Denies nausea, vomiting.    O:  T(C): 36.3 (02-28-20 @ 12:08), Max: 36.3 (02-28-20 @ 12:08)  T(F): 97.4 (02-28-20 @ 12:08), Max: 97.4 (02-28-20 @ 12:08)  HR: 52 (02-28-20 @ 14:32) (46 - 58)  BP: 161/75 (02-28-20 @ 14:08) (147/65 - 189/76)  RR: 16 (02-28-20 @ 14:32) (12 - 21)  SpO2: 98% (02-28-20 @ 14:32) (91% - 100%)  Wt(kg): --                        10.2   5.13  )-----------( 237      ( 28 Feb 2020 06:28 )             31.1     02-28    138  |  102  |  7   ----------------------------<  77  3.9   |  24  |  0.86    Ca    8.7      28 Feb 2020 06:28  Phos  3.3     02-28  Mg     2.1     02-28    TPro  5.9<L>  /  Alb  3.0<L>  /  TBili  0.2  /  DBili  x   /  AST  14  /  ALT  9<L>  /  AlkPhos  39<L>  02-28      Gen: NAD, resting comfortably in bed  C/V: NSR  Pulm: Nonlabored breathing, no respiratory distress  Abd: soft, NT/ND. surgical incisions clean, dry, and intact.  Extrem: WWP, no calf edema or tenderness, SCDs in place

## 2022-06-22 RX ORDER — HYDROCHLOROTHIAZIDE 25 MG/1
25 TABLET ORAL
Qty: 90 | Refills: 3 | Status: ACTIVE | COMMUNITY
Start: 2021-07-15 | End: 1900-01-01

## 2022-08-17 ENCOUNTER — APPOINTMENT (OUTPATIENT)
Dept: HEART AND VASCULAR | Facility: CLINIC | Age: 87
End: 2022-08-17

## 2022-08-17 ENCOUNTER — NON-APPOINTMENT (OUTPATIENT)
Age: 87
End: 2022-08-17

## 2022-08-17 VITALS
DIASTOLIC BLOOD PRESSURE: 62 MMHG | HEIGHT: 63 IN | WEIGHT: 191 LBS | SYSTOLIC BLOOD PRESSURE: 100 MMHG | BODY MASS INDEX: 33.84 KG/M2 | OXYGEN SATURATION: 93 % | TEMPERATURE: 97.9 F | HEART RATE: 60 BPM

## 2022-08-17 PROCEDURE — 93000 ELECTROCARDIOGRAM COMPLETE: CPT

## 2022-08-17 PROCEDURE — 99214 OFFICE O/P EST MOD 30 MIN: CPT | Mod: 25

## 2022-08-17 NOTE — HISTORY OF PRESENT ILLNESS
[FreeTextEntry1] : \par Patient comes for f/u. Palpitations better and BROWN. Compliant on meds. No LOC, no dizziness. BP was well controlled during the last week.  Not compliant on HCTZ. 120//65.\par \par

## 2022-08-17 NOTE — DISCUSSION/SUMMARY
[FreeTextEntry1] : EKG: Sinus  Bradycardia 59/min, SVPC\par ECHO: LV EF 60%, impaired relaxation of LV, mild AR, mild AS, mild MR, mild TR, calcified AV, MAC. Ao 4.0\par Holter/Event: rare VPC, frequent SVPC, no runs\par \par 1. BROWN - likely impaired relaxation - cont bisoprolol 5mg po daily and benicar 40mg po daily\par cont amlodipine 10mg po daily, compliance on HCTZ d/wn pt.- BP diary to continue\par low salt diet\par check CMP\par 2. Palpitations -SVPC - cont bisoprolol\par 3. DM - cont as per PMD\par 4. HLD - cont current meds.\par 5. HTN - cont meds as above.\par 6. Tele visit in 4 weeks.\par

## 2022-08-17 NOTE — REVIEW OF SYSTEMS
[Fever] : no fever [Headache] : no headache [Blurry Vision] : no blurred vision [Earache] : no earache [SOB] : no shortness of breath [Dyspnea on exertion] : not dyspnea during exertion [Leg Claudication] : no intermittent leg claudication [Cough] : no cough [Abdominal Pain] : no abdominal pain [Dysuria] : no dysuria [Joint Pain] : no joint pain [Rash] : no rash [Dizziness] : no dizziness [Confusion] : no confusion was observed [Easy Bleeding] : no tendency for easy bleeding

## 2022-08-18 LAB
ALBUMIN SERPL ELPH-MCNC: 4.2 G/DL
ALP BLD-CCNC: 55 U/L
ALT SERPL-CCNC: 10 U/L
ANION GAP SERPL CALC-SCNC: 13 MMOL/L
AST SERPL-CCNC: 16 U/L
BASOPHILS # BLD AUTO: 0.06 K/UL
BASOPHILS NFR BLD AUTO: 0.9 %
BILIRUB SERPL-MCNC: 0.2 MG/DL
BUN SERPL-MCNC: 18 MG/DL
CALCIUM SERPL-MCNC: 10.1 MG/DL
CHLORIDE SERPL-SCNC: 102 MMOL/L
CHOLEST SERPL-MCNC: 325 MG/DL
CO2 SERPL-SCNC: 24 MMOL/L
CREAT SERPL-MCNC: 1.16 MG/DL
EGFR: 45 ML/MIN/1.73M2
EOSINOPHIL # BLD AUTO: 0.28 K/UL
EOSINOPHIL NFR BLD AUTO: 4 %
ESTIMATED AVERAGE GLUCOSE: 134 MG/DL
GLUCOSE SERPL-MCNC: 143 MG/DL
HBA1C MFR BLD HPLC: 6.3 %
HCT VFR BLD CALC: 39 %
HDLC SERPL-MCNC: 42 MG/DL
HGB BLD-MCNC: 12.3 G/DL
IMM GRANULOCYTES NFR BLD AUTO: 0.3 %
LDLC SERPL CALC-MCNC: 227 MG/DL
LYMPHOCYTES # BLD AUTO: 2.02 K/UL
LYMPHOCYTES NFR BLD AUTO: 28.9 %
MAN DIFF?: NORMAL
MCHC RBC-ENTMCNC: 31.5 GM/DL
MCHC RBC-ENTMCNC: 31.9 PG
MCV RBC AUTO: 101 FL
MONOCYTES # BLD AUTO: 0.82 K/UL
MONOCYTES NFR BLD AUTO: 11.7 %
NEUTROPHILS # BLD AUTO: 3.78 K/UL
NEUTROPHILS NFR BLD AUTO: 54.2 %
NONHDLC SERPL-MCNC: 284 MG/DL
PLATELET # BLD AUTO: 248 K/UL
POTASSIUM SERPL-SCNC: 4.2 MMOL/L
PROT SERPL-MCNC: 6.5 G/DL
RBC # BLD: 3.86 M/UL
RBC # FLD: 14.5 %
SODIUM SERPL-SCNC: 139 MMOL/L
TRIGL SERPL-MCNC: 282 MG/DL
WBC # FLD AUTO: 6.98 K/UL

## 2022-09-14 ENCOUNTER — APPOINTMENT (OUTPATIENT)
Dept: HEART AND VASCULAR | Facility: CLINIC | Age: 87
End: 2022-09-14

## 2022-09-14 DIAGNOSIS — I67.1 CEREBRAL ANEURYSM, NONRUPTURED: ICD-10-CM

## 2022-09-14 DIAGNOSIS — R06.00 DYSPNEA, UNSPECIFIED: ICD-10-CM

## 2022-09-14 DIAGNOSIS — I10 ESSENTIAL (PRIMARY) HYPERTENSION: ICD-10-CM

## 2022-09-14 DIAGNOSIS — R00.2 PALPITATIONS: ICD-10-CM

## 2022-09-14 PROCEDURE — 99214 OFFICE O/P EST MOD 30 MIN: CPT | Mod: 95

## 2022-09-14 NOTE — DISCUSSION/SUMMARY
[FreeTextEntry1] : EKG: Sinus  Bradycardia 59/min, SVPC\par ECHO: LV EF 60%, impaired relaxation of LV, mild AR, mild AS, mild MR, mild TR, calcified AV, MAC. Ao 4.0\par Holter/Event: rare VPC, frequent SVPC, no runs\par \par 1. BROWN - likely impaired relaxation - cont bisoprolol 5mg po daily and benicar 40mg po daily\par cont amlodipine 10mg po daily, compliance on HCTZ d/wn pt.- BP diary to continue\par low salt diet\par 2. Palpitations -SVPC - cont bisoprolol\par 3. DM - cont as per PMD\par 4. HLD - cont current meds.\par 5. HTN - cont meds as above.\par 6. RTC 3 months\par

## 2022-12-21 ENCOUNTER — APPOINTMENT (OUTPATIENT)
Dept: HEART AND VASCULAR | Facility: CLINIC | Age: 87
End: 2022-12-21

## 2023-03-17 NOTE — PRE-OP CHECKLIST - NEEDLE GAUGE
On Call Dr. Donnell Garcia - ([facility] and [facility] out):  Please review and advise re: bolded items. TC to parent regarding fax from [employer] for prior auth  Mom states that a different Rx was prescribed so she doesn't need the one that requires prior auth. However, mom concerned because after drops are instilled, pt states it hurts. Mom would like to know if that is a normal side effect. ADDITIONALLY:   Pharmacy gave a partial fill of the Cefadroxil but then advised that they would not be receiving the balance of the medication in time. Pt has 7 days worth (100ml) of the ABX. Allergic to Amox  Should she get a different Rx or pursue [employer] to complete the fill? Advised mom that RN would consult with on call provider and call back. Pt current condition:       Feeling better      Eating less than usual but has started eating      Went back to school today.      3/15/23 o.v. with [facility]  6/27/22 [facility] 20

## 2024-06-27 ENCOUNTER — RESULT REVIEW (OUTPATIENT)
Age: 89
End: 2024-06-27

## 2024-06-28 ENCOUNTER — TRANSCRIPTION ENCOUNTER (OUTPATIENT)
Age: 89
End: 2024-06-28

## 2024-06-28 ENCOUNTER — RESULT REVIEW (OUTPATIENT)
Age: 89
End: 2024-06-28

## 2024-07-02 ENCOUNTER — EMERGENCY (EMERGENCY)
Facility: HOSPITAL | Age: 89
LOS: 1 days | Discharge: HOME | End: 2024-07-02
Admitting: STUDENT IN AN ORGANIZED HEALTH CARE EDUCATION/TRAINING PROGRAM
Payer: MEDICARE

## 2025-02-03 NOTE — PRE-OP CHECKLIST - HEART RATE (BEATS/MIN)
"Patient presents to clinic with her mother Anel.  Chief Complaint   Patient presents with    Cough     Chest congestion, sinus drainage x 2 days       Initial /60 (BP Location: Right arm, Patient Position: Dangled, Cuff Size: Child)   Pulse (!) 111   Temp 98  F (36.7  C) (Tympanic)   Resp 24   Ht 1.156 m (3' 9.5\")   Wt 20.9 kg (46 lb)   SpO2 99%   BMI 15.62 kg/m   Estimated body mass index is 15.62 kg/m  as calculated from the following:    Height as of this encounter: 1.156 m (3' 9.5\").    Weight as of this encounter: 20.9 kg (46 lb).  Medication Review: complete    The next two questions are to help us understand your food security.  If you are feeling you need any assistance in this area, we have resources available to support you today.          12/20/2024   SDOH- Food Insecurity   Within the past 12 months, did you worry that your food would run out before you got money to buy more? N   Within the past 12 months, did the food you bought just not last and you didn t have money to get more? N         Deedee Braden, JOSEPH      "
50

## 2025-03-27 ENCOUNTER — EMERGENCY (EMERGENCY)
Facility: HOSPITAL | Age: 89
LOS: 1 days | Discharge: HOME | End: 2025-03-27
Attending: STUDENT IN AN ORGANIZED HEALTH CARE EDUCATION/TRAINING PROGRAM
Payer: MEDICARE

## 2025-03-27 VITALS
DIASTOLIC BLOOD PRESSURE: 55 MMHG | HEART RATE: 70 BPM | RESPIRATION RATE: 17 BRPM | OXYGEN SATURATION: 96 % | TEMPERATURE: 97 F | SYSTOLIC BLOOD PRESSURE: 110 MMHG

## 2025-03-27 VITALS
TEMPERATURE: 97 F | RESPIRATION RATE: 17 BRPM | HEART RATE: 74 BPM | OXYGEN SATURATION: 95 % | SYSTOLIC BLOOD PRESSURE: 108 MMHG | DIASTOLIC BLOOD PRESSURE: 50 MMHG | HEIGHT: 63 IN

## 2025-03-27 LAB
ALBUMIN SERPL ELPH-MCNC: 3.1 G/DL — LOW (ref 3.5–5)
ALP SERPL-CCNC: 52 U/L — SIGNIFICANT CHANGE UP (ref 40–120)
ALT FLD-CCNC: 23 U/L DA — SIGNIFICANT CHANGE UP (ref 10–60)
ANION GAP SERPL CALC-SCNC: 7 MMOL/L — SIGNIFICANT CHANGE UP (ref 5–17)
AST SERPL-CCNC: 18 U/L — SIGNIFICANT CHANGE UP (ref 10–40)
BASOPHILS # BLD AUTO: 0.05 K/UL — SIGNIFICANT CHANGE UP (ref 0–0.2)
BASOPHILS NFR BLD AUTO: 0.4 % — SIGNIFICANT CHANGE UP (ref 0–2)
BILIRUB SERPL-MCNC: 0.4 MG/DL — SIGNIFICANT CHANGE UP (ref 0.2–1.2)
BUN SERPL-MCNC: 31 MG/DL — HIGH (ref 7–18)
CALCIUM SERPL-MCNC: 8.9 MG/DL — SIGNIFICANT CHANGE UP (ref 8.4–10.5)
CHLORIDE SERPL-SCNC: 104 MMOL/L — SIGNIFICANT CHANGE UP (ref 96–108)
CO2 SERPL-SCNC: 26 MMOL/L — SIGNIFICANT CHANGE UP (ref 22–31)
CREAT SERPL-MCNC: 1.32 MG/DL — HIGH (ref 0.5–1.3)
EGFR: 38 ML/MIN/1.73M2 — LOW
EGFR: 38 ML/MIN/1.73M2 — LOW
EOSINOPHIL # BLD AUTO: 0.25 K/UL — SIGNIFICANT CHANGE UP (ref 0–0.5)
EOSINOPHIL NFR BLD AUTO: 2 % — SIGNIFICANT CHANGE UP (ref 0–6)
HCT VFR BLD CALC: 37.8 % — SIGNIFICANT CHANGE UP (ref 34.5–45)
HGB BLD-MCNC: 12.5 G/DL — SIGNIFICANT CHANGE UP (ref 11.5–15.5)
IMM GRANULOCYTES NFR BLD AUTO: 0.6 % — SIGNIFICANT CHANGE UP (ref 0–0.9)
INR BLD: 1.3 RATIO — HIGH (ref 0.85–1.16)
LYMPHOCYTES # BLD AUTO: 18.5 % — SIGNIFICANT CHANGE UP (ref 13–44)
LYMPHOCYTES # BLD AUTO: 2.35 K/UL — SIGNIFICANT CHANGE UP (ref 1–3.3)
MCHC RBC-ENTMCNC: 30.3 PG — SIGNIFICANT CHANGE UP (ref 27–34)
MCHC RBC-ENTMCNC: 33.1 G/DL — SIGNIFICANT CHANGE UP (ref 32–36)
MCV RBC AUTO: 91.7 FL — SIGNIFICANT CHANGE UP (ref 80–100)
MONOCYTES # BLD AUTO: 1.03 K/UL — HIGH (ref 0–0.9)
MONOCYTES NFR BLD AUTO: 8.1 % — SIGNIFICANT CHANGE UP (ref 2–14)
NEUTROPHILS # BLD AUTO: 8.92 K/UL — HIGH (ref 1.8–7.4)
NEUTROPHILS NFR BLD AUTO: 70.4 % — SIGNIFICANT CHANGE UP (ref 43–77)
NRBC BLD AUTO-RTO: 0 /100 WBCS — SIGNIFICANT CHANGE UP (ref 0–0)
PLATELET # BLD AUTO: 215 K/UL — SIGNIFICANT CHANGE UP (ref 150–400)
POTASSIUM SERPL-MCNC: 4 MMOL/L — SIGNIFICANT CHANGE UP (ref 3.5–5.3)
POTASSIUM SERPL-SCNC: 4 MMOL/L — SIGNIFICANT CHANGE UP (ref 3.5–5.3)
PROT SERPL-MCNC: 6.1 G/DL — SIGNIFICANT CHANGE UP (ref 6–8.3)
PROTHROM AB SERPL-ACNC: 15 SEC — HIGH (ref 9.9–13.4)
RBC # BLD: 4.12 M/UL — SIGNIFICANT CHANGE UP (ref 3.8–5.2)
RBC # FLD: 14.1 % — SIGNIFICANT CHANGE UP (ref 10.3–14.5)
SODIUM SERPL-SCNC: 137 MMOL/L — SIGNIFICANT CHANGE UP (ref 135–145)
TROPONIN I, HIGH SENSITIVITY RESULT: 10.5 NG/L — SIGNIFICANT CHANGE UP
WBC # BLD: 12.67 K/UL — HIGH (ref 3.8–10.5)
WBC # FLD AUTO: 12.67 K/UL — HIGH (ref 3.8–10.5)

## 2025-03-27 PROCEDURE — 84484 ASSAY OF TROPONIN QUANT: CPT

## 2025-03-27 PROCEDURE — 24500 CLTX HUMRL SHFT FX W/O MNPJ: CPT | Mod: LT

## 2025-03-27 PROCEDURE — 24576 CLTX HUMRL CNDYLR FX WO MNPJ: CPT | Mod: 54,LT

## 2025-03-27 PROCEDURE — 93005 ELECTROCARDIOGRAM TRACING: CPT

## 2025-03-27 PROCEDURE — 99284 EMERGENCY DEPT VISIT MOD MDM: CPT | Mod: 57

## 2025-03-27 PROCEDURE — 80053 COMPREHEN METABOLIC PANEL: CPT

## 2025-03-27 PROCEDURE — 99283 EMERGENCY DEPT VISIT LOW MDM: CPT | Mod: 25

## 2025-03-27 PROCEDURE — 85610 PROTHROMBIN TIME: CPT

## 2025-03-27 PROCEDURE — 93010 ELECTROCARDIOGRAM REPORT: CPT

## 2025-03-27 PROCEDURE — 36415 COLL VENOUS BLD VENIPUNCTURE: CPT

## 2025-03-27 PROCEDURE — 85025 COMPLETE CBC W/AUTO DIFF WBC: CPT

## 2025-03-27 PROCEDURE — 85730 THROMBOPLASTIN TIME PARTIAL: CPT

## 2025-03-27 NOTE — ED PROVIDER NOTE - PHYSICAL EXAMINATION
left distal humeral deformity   compartments soft   distal sensation and cap refill intact radial pulse intact

## 2025-03-27 NOTE — ED PROVIDER NOTE - NSFOLLOWUPINSTRUCTIONS_ED_ALL_ED_FT
Cast or Splint Care, Adult  Casts and splints are supports that are worn to protect broken bones and other injuries. A cast or splint may hold a bone still and in the correct position while it heals. Casts and splints may also help with pain, swelling, and muscle spasms.    A cast is a hardened support that is usually made of fiberglass or plaster. It is custom-fit to the body and offers more protection than a splint. Most casts cannot be taken off and put back on. A splint is a type of soft support that is usually made from cloth and elastic. It can be adjusted or taken off as needed. Often, when a bone is broken, a splint is put on until the swelling goes down, and then the splint is replaced by a cast.    You may need a cast or a splint if you:  Have a broken bone.  Have a soft-tissue injury.  Need to keep an injured body part from moving (keep it immobile) after surgery.  What are the risks?  In some cases, wearing a cast or splint can cause a reduced blood supply to the wrist or hand or to the foot and toes. This can happen if there is a lot of swelling or if the cast or splint is too tight. Limited blood supply results in a condition called compartment syndrome and can cause permanent damage. Symptoms include:  Pain that is getting worse.  Numbness and tingling.  Changes in skin color, including paleness or a bluish color.  Cold fingers or toes.  Other complications of wearing a cast or splint can include:  Skin irritation that can cause itching, rash, skin sores, or skin infection.  Limb stiffness or weakness.  How to care for a nonremovable cast or splint  A person checking the skin around a cast on his foot and lower leg.  Do not put pressure on any part of the cast or splint until it is fully hardened. This may take several hours.  Check the skin around the cast or splint every day. Tell your health care provider about any concerns.  Do not stick anything inside the cast or splint to scratch your skin. Doing that increases your risk of infection.  You may put lotion on dry skin around the edges of the cast or splint. Do not put lotion on the skin underneath the cast or splint.  Keep the cast or splint clean and dry.  How to care for a removable splint  Wear the splint as told by your health care provider. Remove it only as told by your health care provider.  Check the skin around the splint every day. Tell your health care provider about any concerns.  Loosen the splint if your fingers tingle, become numb, or turn cold and blue.  Keep the splint clean and dry. Clean your splint as told by your health care provider. Use mild soap and water and let it air-dry. Do not use heat on your splint.  Follow these instructions at home:  Bathing    Do not take baths, swim, or use a hot tub until your health care provider approves. Ask your health care provider if you may take showers. You may only be allowed to take sponge baths.  If your cast or splint is not waterproof:  Do not let it get wet.  Cover it with a watertight covering when you take a bath or shower.  Managing pain, stiffness, and swelling    A person resting with her lower leg elevated.   If directed, put ice on the affected area. To do this:  If you have a removable cast or splint, remove it as told by your health care provider.  Put ice in a plastic bag.  Place a towel between your skin and the bag or between your cast and the bag.  Leave the ice on for 20 minutes, 2–3 times a day.  Remove the ice if your skin turns bright red. This is very important. If you cannot feel pain, heat, or cold, you have a greater risk of damage to the area.  Move your fingers or toes often to reduce stiffness and swelling.  Raise (elevate) the injured area above the level of your heart while you are sitting or lying down.  Safety    Do not use the injured limb to support your body weight until your health care provider says that you can. Use crutches or other assistive devices as told by your health care provider.  Ask your health care provider when it is safe to drive if you have a cast or splint on part of your body.  General instructions    Take over-the-counter and prescription medicines only as told by your health care provider.  Return to your normal activities as told by your health care provider. Ask your health care provider what activities are safe for you.  Keep all follow-up visits. This is important.  Contact a health care provider if:  The skin around the cast or splint gets red or raw.  The skin under the cast is extremely itchy or painful.  Your cast or splint:  Gets damaged.  Feels very uncomfortable.  Is too tight or too loose.  Your cast becomes wet or develops a soft spot or area.  You notice a bad smell coming from under your cast.  You get an object stuck under your cast.  There is fluid leaking through the cast.  Get help right away if:  You develop any symptoms of compartment syndrome, such as:  Severe pain or pressure under the cast.  Numbness, tingling, coldness, or pale or bluish skin.  The part of your body above or below the cast is swollen and discolored.  You cannot feel or move your fingers or toes.  You have trouble breathing or shortness of breath.  You have chest pain.  Your pain gets worse.  These symptoms may represent a serious problem that is an emergency. Do not wait to see if the symptoms will go away. Get medical help right away. Call your local emergency services (911 in the [place] Do not drive yourself to the hospital.    Summary  Casts and splints are worn to protect broken bones and other injuries.  Casts and splints should remain clean and dry.  Remove your cast or splint only as told by your health care provider.  Get help right away if your pain gets worse, or if you have numbness, tingling, or skin that turns cold, blue, or discolored.  This information is not intended to replace advice given to you by your health care provider. Make sure you discuss any questions you have with your health care provider.      Humerus Fracture Treated With Immobilization  Right arm and hand showing skeleton with a humerus fracture.  A humerus fracture is a break in the large bone in the upper arm (humerus). If the joint is stable and the bones are still in their normal position (nondisplaced), the injury may be treated with immobilization. This involves the use of a cast, splint, or sling to hold your arm in place. Immobilization ensures that your bones continue to stay in the correct position while your arm is healing.    What are the causes?  This condition may be caused by:  A fall.  A hard, direct hit to the arm.  A motor vehicle accident.  What increases the risk?  The following factors may make you more likely to develop this condition:  Having a disease that makes the bones thin and weak.  Being elderly.  What are the signs or symptoms?  Symptoms of this condition include:  Pain.  Swelling.  Bruising.  Not being able to move your arm normally.  How is this diagnosed?  This condition may be diagnosed based on:  A physical exam.  X-rays of your upper arm, elbow, and shoulder.  CT scan.  How is this treated?  Treatment for this condition involves wearing a cast, splint, or sling until the injured area is stable enough for you to begin range-of-motion exercises. You may also be prescribed pain medicine.    Follow these instructions at home:  If you have a nonremovable cast or splint:    Do not put pressure on any part of the cast or splint until it is fully hardened. This may take several hours.  Do not stick anything inside the cast or splint to scratch your skin. Doing that increases your risk of infection.  Check the skin around the cast or splint every day. Tell your health care provider about any concerns.  You may put lotion on dry skin around the edges of the cast or splint. Do not put lotion on the skin underneath the cast or splint.  Keep the cast or splint clean and dry.  If you have a removable splint or sling:    Wear the splint or sling as told by your health care provider. Remove it only as told by your health care provider.  Check the skin around the splint or sling every day. Tell your health care provider about any concerns.  Loosen the splint or sling if your fingers tingle, become numb, or turn cold and blue.  Keep the splint or sling clean and dry.  Bathing    Do not take baths, swim, or use a hot tub until your health care provider approves. Ask your health care provider if you may take showers. You may only be allowed to take sponge baths.  If the cast, splint, or sling is not waterproof:  Do not let it get wet.  Cover it with a watertight covering when you take a bath or shower.  Managing pain, stiffness, and swelling    Bag of ice on a towel on the skin.   If directed, put ice on the injured area. To do this:  If you have a removable splint or sling, remove it as told by your health care provider.  Put ice in a plastic bag.  Place a towel between your skin and the bag or between your nonremovable cast or sling and the bag.  Leave the ice on for 20 minutes, 2–3 times a day.  Remove the ice if your skin turns bright red. This is very important. If you cannot feel pain, heat, or cold, you have a greater risk of damage to the area.  Move your fingers often to reduce stiffness and swelling.  Raise the injured area above the level of your heart while you are sitting or lying down.  Driving    Do not drive or operate machinery while taking prescription pain medicine.  Ask your health care provider when it is safe to drive if you have a cast, splint, or sling on your arm.  Activity    Do not lift anything until your health care provider says that it is safe.  Return to your normal activities as told by your health care provider. Ask your health care provider what activities are safe for you.  Do range-of-motion exercises only as told by your health care provider or physical therapist.  General instructions    Do not use any products that contain nicotine or tobacco. These products include cigarettes, chewing tobacco, and vaping devices, such as e-cigarettes. These can delay bone healing. If you need help quitting, ask your health care provider.  Take over-the-counter and prescription medicines only as told by your health care provider.  Ask your health care provider if the medicine prescribed to you:  Can cause constipation. You may need to take these actions to prevent or treat constipation:  Drink enough fluid to keep your urine pale yellow.  Take over-the-counter or prescription medicines.  Eat foods that are high in fiber, such as beans, whole grains, and fresh fruits and vegetables.  Limit foods that are high in fat and processed sugars, such as fried or sweet foods.  Keep all follow-up visits. This is important.  Contact a health care provider if:  You have any new pain, swelling, or bruising.  Your pain, swelling, and bruising do not improve.  Your cast, splint, or sling becomes loose or damaged.  Get help right away if:  Your skin or fingers on your injured arm turn blue or gray.  Your arm feels cold or numb.  You have severe pain in your injured arm.  Summary  A humerus fracture is a break in the large bone in the upper arm.  Immobilization involves the use of a cast, splint, or sling to hold your arm in place while the injury heals.  Wear a splint or sling as told by your health care provider. Remove it only as told by your health care provider.  Move your fingers often to reduce stiffness and swelling.  This information is not intended to replace advice given to you by your health care provider. Make sure you discuss any questions you have with your health care provider.

## 2025-03-27 NOTE — ED PROVIDER NOTE - OBJECTIVE STATEMENT
89-year-old presenting with left arm pain status post fall patient daughter bedside for translation states that she fell after getting up from bed noting lightheadedness denies any head injury or LOC patient denies any chest pain shortness of breath patient had x-ray that was performed today showing left arm fracture patient has CD of x-ray with her. patient denies any numbness tingling weakness

## 2025-03-27 NOTE — ED PROVIDER NOTE - PATIENT PORTAL LINK FT
You can access the FollowMyHealth Patient Portal offered by [facility] by registering at the following website: [email or web address]. By joining [facility]’s FollowMyHealth portal, you will also be able to view your health information using other applications (apps) compatible with our system.

## 2025-03-27 NOTE — ED PROVIDER NOTE - CLINICAL SUMMARY MEDICAL DECISION MAKING FREE TEXT BOX
patient presenting with left arm fracture CD patient brought for show left humeral fracture distally will place patient in posterior long-arm given patient also noting lightheadedness that resulted in fall will obtain EKG lab work rule out ACS assess electrolyte ED observation and reassess

## 2025-03-27 NOTE — ED PROVIDER NOTE - NSFOLLOWUPCLINICS_GEN_ALL_ED_FT
[facility]  Orthopedics  [address] [place] [address]  Phone: [phone]  Fax: [phone]

## 2025-03-27 NOTE — ED ADULT NURSE NOTE - NSFALLHARMRISKINTERV_ED_ALL_ED

## 2025-03-27 NOTE — ED ADULT TRIAGE NOTE - CHIEF COMPLAINT QUOTE
RRT on the first floor. C/o left arm pain s/p fall yesterday. Reports she is on aspirin. Patient is hard of hearing, unable to obtain additional history

## 2025-03-27 NOTE — ED PROVIDER NOTE - PROGRESS NOTE DETAILS
lab negative for acute finding neurovascular intact well-appearing patient placed in posterior long-arm given Ortho follow-up in sling patient is clinically stable neurovasc intact on discharge return precaution instructed patient daughter at bedside states they feel comfortable taking the patient at home instructed to return if difficulty with ADLs or other new or worsening symptoms

## 2025-03-28 PROBLEM — N18.9 CHRONIC KIDNEY DISEASE, UNSPECIFIED: Chronic | Status: ACTIVE | Noted: 2024-06-27

## 2025-03-28 PROBLEM — J44.89 OTHER SPECIFIED CHRONIC OBSTRUCTIVE PULMONARY DISEASE: Chronic | Status: ACTIVE | Noted: 2024-06-27

## 2025-03-28 PROBLEM — I10 ESSENTIAL (PRIMARY) HYPERTENSION: Chronic | Status: ACTIVE | Noted: 2024-06-27

## 2025-03-28 PROBLEM — E78.5 HYPERLIPIDEMIA, UNSPECIFIED: Chronic | Status: ACTIVE | Noted: 2024-06-27

## 2025-03-28 PROBLEM — E11.9 TYPE 2 DIABETES MELLITUS WITHOUT COMPLICATIONS: Chronic | Status: ACTIVE | Noted: 2024-06-27

## 2025-04-08 ENCOUNTER — APPOINTMENT (OUTPATIENT)
Dept: ORTHOPEDIC SURGERY | Facility: CLINIC | Age: 89
End: 2025-04-08

## 2025-04-08 ENCOUNTER — NON-APPOINTMENT (OUTPATIENT)
Age: 89
End: 2025-04-08

## 2025-04-08 VITALS — HEIGHT: 63 IN | WEIGHT: 187 LBS | BODY MASS INDEX: 33.13 KG/M2

## 2025-04-08 PROCEDURE — 73080 X-RAY EXAM OF ELBOW: CPT | Mod: LT

## 2025-04-08 PROCEDURE — 99203 OFFICE O/P NEW LOW 30 MIN: CPT

## 2025-04-26 ENCOUNTER — NON-APPOINTMENT (OUTPATIENT)
Age: 89
End: 2025-04-26

## 2025-04-28 ENCOUNTER — APPOINTMENT (OUTPATIENT)
Age: 89
End: 2025-04-28

## 2025-04-28 VITALS
DIASTOLIC BLOOD PRESSURE: 47 MMHG | SYSTOLIC BLOOD PRESSURE: 88 MMHG | OXYGEN SATURATION: 99 % | HEIGHT: 63 IN | WEIGHT: 187 LBS | BODY MASS INDEX: 33.13 KG/M2 | HEART RATE: 67 BPM

## 2025-04-28 PROCEDURE — 73110 X-RAY EXAM OF WRIST: CPT | Mod: LT

## 2025-04-28 PROCEDURE — 99213 OFFICE O/P EST LOW 20 MIN: CPT

## 2025-04-28 PROCEDURE — 73080 X-RAY EXAM OF ELBOW: CPT | Mod: LT

## 2025-05-15 PROBLEM — S42.412A CLOSED SUPRACONDYLAR FRACTURE OF LEFT HUMERUS, INITIAL ENCOUNTER: Status: ACTIVE | Noted: 2025-05-15

## 2025-06-02 ENCOUNTER — APPOINTMENT (OUTPATIENT)
Age: 89
End: 2025-06-02

## 2025-06-02 ENCOUNTER — APPOINTMENT (OUTPATIENT)
Age: 89
End: 2025-06-02
Payer: MEDICARE

## 2025-06-02 VITALS
HEIGHT: 63 IN | BODY MASS INDEX: 33.13 KG/M2 | HEART RATE: 79 BPM | DIASTOLIC BLOOD PRESSURE: 58 MMHG | OXYGEN SATURATION: 97 % | SYSTOLIC BLOOD PRESSURE: 101 MMHG | WEIGHT: 187 LBS

## 2025-06-02 PROCEDURE — 73080 X-RAY EXAM OF ELBOW: CPT | Mod: LT

## 2025-06-02 PROCEDURE — 99213 OFFICE O/P EST LOW 20 MIN: CPT

## 2025-06-02 PROCEDURE — 93971 EXTREMITY STUDY: CPT | Mod: RT

## 2025-07-16 ENCOUNTER — APPOINTMENT (OUTPATIENT)
Age: 89
End: 2025-07-16